# Patient Record
Sex: FEMALE | Race: WHITE | NOT HISPANIC OR LATINO | Employment: OTHER | URBAN - NONMETROPOLITAN AREA
[De-identification: names, ages, dates, MRNs, and addresses within clinical notes are randomized per-mention and may not be internally consistent; named-entity substitution may affect disease eponyms.]

---

## 2018-04-18 ENCOUNTER — HOSPITAL ENCOUNTER (INPATIENT)
Facility: HOSPITAL | Age: 78
LOS: 1 days | Discharge: HOME | End: 2018-04-19
Attending: INTERNAL MEDICINE | Admitting: INTERNAL MEDICINE

## 2018-04-18 PROCEDURE — 93458 L HRT ARTERY/VENTRICLE ANGIO: CPT | Performed by: INTERNAL MEDICINE

## 2018-04-18 PROCEDURE — C1894 INTRO/SHEATH, NON-LASER: HCPCS | Performed by: INTERNAL MEDICINE

## 2018-04-18 PROCEDURE — 25010000002 HEPARIN (PORCINE) PER 1000 UNITS: Performed by: INTERNAL MEDICINE

## 2018-04-18 PROCEDURE — 99283 EMERGENCY DEPT VISIT LOW MDM: CPT

## 2018-04-18 PROCEDURE — 4A023N7 MEASUREMENT OF CARDIAC SAMPLING AND PRESSURE, LEFT HEART, PERCUTANEOUS APPROACH: ICD-10-PCS | Performed by: INTERNAL MEDICINE

## 2018-04-18 PROCEDURE — B2151ZZ FLUOROSCOPY OF LEFT HEART USING LOW OSMOLAR CONTRAST: ICD-10-PCS | Performed by: INTERNAL MEDICINE

## 2018-04-18 PROCEDURE — B2111ZZ FLUOROSCOPY OF MULTIPLE CORONARY ARTERIES USING LOW OSMOLAR CONTRAST: ICD-10-PCS | Performed by: INTERNAL MEDICINE

## 2018-04-18 PROCEDURE — 25010000002 IOPAMIDOL 61 % SOLUTION: Performed by: INTERNAL MEDICINE

## 2018-04-18 PROCEDURE — 99152 MOD SED SAME PHYS/QHP 5/>YRS: CPT | Performed by: INTERNAL MEDICINE

## 2018-04-18 PROCEDURE — 25010000002 DIPHENHYDRAMINE PER 50 MG: Performed by: INTERNAL MEDICINE

## 2018-04-18 RX ORDER — DIPHENHYDRAMINE HYDROCHLORIDE 50 MG/ML
INJECTION INTRAMUSCULAR; INTRAVENOUS AS NEEDED
Status: DISCONTINUED | OUTPATIENT
Start: 2018-04-18 | End: 2018-04-19 | Stop reason: HOSPADM

## 2018-04-18 RX ORDER — LIDOCAINE HYDROCHLORIDE 20 MG/ML
INJECTION, SOLUTION INFILTRATION; PERINEURAL AS NEEDED
Status: DISCONTINUED | OUTPATIENT
Start: 2018-04-18 | End: 2018-04-19 | Stop reason: HOSPADM

## 2018-04-19 ENCOUNTER — APPOINTMENT (OUTPATIENT)
Dept: GENERAL RADIOLOGY | Facility: HOSPITAL | Age: 78
End: 2018-04-19

## 2018-04-19 ENCOUNTER — APPOINTMENT (OUTPATIENT)
Dept: CARDIOLOGY | Facility: HOSPITAL | Age: 78
End: 2018-04-19
Attending: INTERNAL MEDICINE

## 2018-04-19 VITALS
WEIGHT: 133 LBS | BODY MASS INDEX: 23.57 KG/M2 | OXYGEN SATURATION: 94 % | DIASTOLIC BLOOD PRESSURE: 46 MMHG | RESPIRATION RATE: 16 BRPM | HEIGHT: 63 IN | HEART RATE: 60 BPM | TEMPERATURE: 99.1 F | SYSTOLIC BLOOD PRESSURE: 109 MMHG

## 2018-04-19 PROBLEM — I50.23 ACUTE ON CHRONIC SYSTOLIC HEART FAILURE (HCC): Status: ACTIVE | Noted: 2018-04-19

## 2018-04-19 LAB
ANION GAP SERPL CALCULATED.3IONS-SCNC: 12 MMOL/L (ref 4–13)
ARTICHOKE IGE QN: 70 MG/DL (ref 0–99)
BH CV ECHO MEAS - AO MAX PG (FULL): 3.2 MMHG
BH CV ECHO MEAS - AO MAX PG: 7 MMHG
BH CV ECHO MEAS - AO MEAN PG (FULL): 1 MMHG
BH CV ECHO MEAS - AO MEAN PG: 3 MMHG
BH CV ECHO MEAS - AO ROOT AREA (BSA CORRECTED): 2.2
BH CV ECHO MEAS - AO ROOT AREA: 10.2 CM^2
BH CV ECHO MEAS - AO ROOT DIAM: 3.6 CM
BH CV ECHO MEAS - AO V2 MAX: 132 CM/SEC
BH CV ECHO MEAS - AO V2 MEAN: 77.8 CM/SEC
BH CV ECHO MEAS - AO V2 VTI: 23.1 CM
BH CV ECHO MEAS - AVA(I,A): 2.5 CM^2
BH CV ECHO MEAS - AVA(I,D): 2.5 CM^2
BH CV ECHO MEAS - AVA(V,A): 2.3 CM^2
BH CV ECHO MEAS - AVA(V,D): 2.3 CM^2
BH CV ECHO MEAS - BSA(HAYCOCK): 1.6 M^2
BH CV ECHO MEAS - BSA: 1.6 M^2
BH CV ECHO MEAS - BZI_BMI: 23.6 KILOGRAMS/M^2
BH CV ECHO MEAS - BZI_METRIC_HEIGHT: 160 CM
BH CV ECHO MEAS - BZI_METRIC_WEIGHT: 60.3 KG
BH CV ECHO MEAS - CONTRAST EF (2CH): 45.3 ML/M^2
BH CV ECHO MEAS - CONTRAST EF 4CH: 50.7 ML/M^2
BH CV ECHO MEAS - EDV(CUBED): 169.6 ML
BH CV ECHO MEAS - EDV(MOD-SP2): 159 ML
BH CV ECHO MEAS - EDV(MOD-SP4): 122 ML
BH CV ECHO MEAS - EDV(TEICH): 149.6 ML
BH CV ECHO MEAS - EF(CUBED): 36.8 %
BH CV ECHO MEAS - EF(MOD-SP2): 45.3 %
BH CV ECHO MEAS - EF(MOD-SP4): 50.7 %
BH CV ECHO MEAS - EF(TEICH): 29.9 %
BH CV ECHO MEAS - ESV(CUBED): 107.2 ML
BH CV ECHO MEAS - ESV(MOD-SP2): 86.9 ML
BH CV ECHO MEAS - ESV(MOD-SP4): 60.1 ML
BH CV ECHO MEAS - ESV(TEICH): 104.9 ML
BH CV ECHO MEAS - FS: 14.2 %
BH CV ECHO MEAS - IVS/LVPW: 1.9
BH CV ECHO MEAS - IVSD: 1.9 CM
BH CV ECHO MEAS - LA DIMENSION: 5.3 CM
BH CV ECHO MEAS - LA/AO: 1.5
BH CV ECHO MEAS - LAT PEAK E' VEL: 5 CM/SEC
BH CV ECHO MEAS - LV DIASTOLIC VOL/BSA (35-75): 75 ML/M^2
BH CV ECHO MEAS - LV MASS(C)D: 370.7 GRAMS
BH CV ECHO MEAS - LV MASS(C)DI: 228 GRAMS/M^2
BH CV ECHO MEAS - LV MAX PG: 3.8 MMHG
BH CV ECHO MEAS - LV MEAN PG: 2 MMHG
BH CV ECHO MEAS - LV SYSTOLIC VOL/BSA (12-30): 37 ML/M^2
BH CV ECHO MEAS - LV V1 MAX: 97.5 CM/SEC
BH CV ECHO MEAS - LV V1 MEAN: 57.2 CM/SEC
BH CV ECHO MEAS - LV V1 VTI: 18.2 CM
BH CV ECHO MEAS - LVIDD: 5.5 CM
BH CV ECHO MEAS - LVIDS: 4.8 CM
BH CV ECHO MEAS - LVLD AP2: 9.3 CM
BH CV ECHO MEAS - LVLD AP4: 8.6 CM
BH CV ECHO MEAS - LVLS AP2: 8.9 CM
BH CV ECHO MEAS - LVLS AP4: 7.5 CM
BH CV ECHO MEAS - LVOT AREA (M): 3.1 CM^2
BH CV ECHO MEAS - LVOT AREA: 3.1 CM^2
BH CV ECHO MEAS - LVOT DIAM: 2 CM
BH CV ECHO MEAS - LVPWD: 1 CM
BH CV ECHO MEAS - MV A MAX VEL: 33.5 CM/SEC
BH CV ECHO MEAS - MV DEC TIME: 0.1 SEC
BH CV ECHO MEAS - MV E MAX VEL: 99.4 CM/SEC
BH CV ECHO MEAS - MV E/A: 3
BH CV ECHO MEAS - SI(AO): 144.6 ML/M^2
BH CV ECHO MEAS - SI(CUBED): 38.4 ML/M^2
BH CV ECHO MEAS - SI(LVOT): 35.2 ML/M^2
BH CV ECHO MEAS - SI(MOD-SP2): 44.3 ML/M^2
BH CV ECHO MEAS - SI(MOD-SP4): 38.1 ML/M^2
BH CV ECHO MEAS - SI(TEICH): 27.5 ML/M^2
BH CV ECHO MEAS - SV(AO): 235.1 ML
BH CV ECHO MEAS - SV(CUBED): 62.4 ML
BH CV ECHO MEAS - SV(LVOT): 57.2 ML
BH CV ECHO MEAS - SV(MOD-SP2): 72.1 ML
BH CV ECHO MEAS - SV(MOD-SP4): 61.9 ML
BH CV ECHO MEAS - SV(TEICH): 44.7 ML
BUN BLD-MCNC: 14 MG/DL (ref 5–21)
BUN/CREAT SERPL: 15.2 (ref 7–25)
CALCIUM SPEC-SCNC: 9.9 MG/DL (ref 8.4–10.4)
CHLORIDE SERPL-SCNC: 101 MMOL/L (ref 98–110)
CHOLEST SERPL-MCNC: 128 MG/DL (ref 130–200)
CO2 SERPL-SCNC: 29 MMOL/L (ref 24–31)
CREAT BLD-MCNC: 0.92 MG/DL (ref 0.5–1.4)
DEPRECATED RDW RBC AUTO: 47.8 FL (ref 40–54)
DIGOXIN SERPL-MCNC: 0.8 NG/ML (ref 0.8–2)
ERYTHROCYTE [DISTWIDTH] IN BLOOD BY AUTOMATED COUNT: 13.8 % (ref 12–15)
GFR SERPL CREATININE-BSD FRML MDRD: 59 ML/MIN/1.73
GLUCOSE BLD-MCNC: 139 MG/DL (ref 70–100)
HCT VFR BLD AUTO: 39.8 % (ref 37–47)
HDLC SERPL-MCNC: 32 MG/DL
HGB BLD-MCNC: 13.2 G/DL (ref 12–16)
INR PPP: 2.22 (ref 0.91–1.09)
LDLC/HDLC SERPL: 2.29 {RATIO}
LEFT ATRIUM VOLUME INDEX: 101 ML/M2
MAGNESIUM SERPL-MCNC: 1.8 MG/DL (ref 1.4–2.2)
MAXIMAL PREDICTED HEART RATE: 140 BPM
MCH RBC QN AUTO: 31.5 PG (ref 28–32)
MCHC RBC AUTO-ENTMCNC: 33.2 G/DL (ref 33–36)
MCV RBC AUTO: 95 FL (ref 82–98)
PLATELET # BLD AUTO: 156 10*3/MM3 (ref 130–400)
PMV BLD AUTO: 12 FL (ref 6–12)
POTASSIUM BLD-SCNC: 3.8 MMOL/L (ref 3.5–5.3)
PROTHROMBIN TIME: 25.4 SECONDS (ref 11.9–14.6)
RBC # BLD AUTO: 4.19 10*6/MM3 (ref 4.2–5.4)
SODIUM BLD-SCNC: 142 MMOL/L (ref 135–145)
STRESS TARGET HR: 119 BPM
TRIGL SERPL-MCNC: 114 MG/DL (ref 0–149)
WBC NRBC COR # BLD: 13.36 10*3/MM3 (ref 4.8–10.8)

## 2018-04-19 PROCEDURE — 85027 COMPLETE CBC AUTOMATED: CPT | Performed by: INTERNAL MEDICINE

## 2018-04-19 PROCEDURE — 85610 PROTHROMBIN TIME: CPT | Performed by: INTERNAL MEDICINE

## 2018-04-19 PROCEDURE — 80061 LIPID PANEL: CPT | Performed by: INTERNAL MEDICINE

## 2018-04-19 PROCEDURE — 93306 TTE W/DOPPLER COMPLETE: CPT | Performed by: INTERNAL MEDICINE

## 2018-04-19 PROCEDURE — 93306 TTE W/DOPPLER COMPLETE: CPT

## 2018-04-19 PROCEDURE — 80048 BASIC METABOLIC PNL TOTAL CA: CPT | Performed by: INTERNAL MEDICINE

## 2018-04-19 PROCEDURE — 25010000002 FUROSEMIDE PER 20 MG: Performed by: INTERNAL MEDICINE

## 2018-04-19 PROCEDURE — 71045 X-RAY EXAM CHEST 1 VIEW: CPT

## 2018-04-19 PROCEDURE — 83735 ASSAY OF MAGNESIUM: CPT | Performed by: INTERNAL MEDICINE

## 2018-04-19 PROCEDURE — 99236 HOSP IP/OBS SAME DATE HI 85: CPT | Performed by: INTERNAL MEDICINE

## 2018-04-19 PROCEDURE — 80162 ASSAY OF DIGOXIN TOTAL: CPT | Performed by: INTERNAL MEDICINE

## 2018-04-19 RX ORDER — POTASSIUM CHLORIDE 1500 MG/1
20 TABLET, FILM COATED, EXTENDED RELEASE ORAL DAILY
COMMUNITY

## 2018-04-19 RX ORDER — ENALAPRIL MALEATE 10 MG/1
20 TABLET ORAL EVERY 12 HOURS SCHEDULED
Status: DISCONTINUED | OUTPATIENT
Start: 2018-04-19 | End: 2018-04-19 | Stop reason: HOSPADM

## 2018-04-19 RX ORDER — WARFARIN SODIUM 5 MG/1
5 TABLET ORAL
Status: DISCONTINUED | OUTPATIENT
Start: 2018-04-19 | End: 2018-04-19 | Stop reason: HOSPADM

## 2018-04-19 RX ORDER — DIGOXIN 125 MCG
125 TABLET ORAL
COMMUNITY

## 2018-04-19 RX ORDER — POTASSIUM CHLORIDE 750 MG/1
20 CAPSULE, EXTENDED RELEASE ORAL DAILY
Status: DISCONTINUED | OUTPATIENT
Start: 2018-04-19 | End: 2018-04-19 | Stop reason: HOSPADM

## 2018-04-19 RX ORDER — ACETAMINOPHEN 325 MG/1
650 TABLET ORAL EVERY 4 HOURS PRN
Status: DISCONTINUED | OUTPATIENT
Start: 2018-04-19 | End: 2018-04-19 | Stop reason: HOSPADM

## 2018-04-19 RX ORDER — WARFARIN SODIUM 5 MG/1
5 TABLET ORAL
COMMUNITY
End: 2019-01-01 | Stop reason: HOSPADM

## 2018-04-19 RX ORDER — CARVEDILOL 25 MG/1
25 TABLET ORAL DAILY
Status: DISCONTINUED | OUTPATIENT
Start: 2018-04-19 | End: 2018-04-19 | Stop reason: HOSPADM

## 2018-04-19 RX ORDER — SODIUM CHLORIDE 9 MG/ML
100 INJECTION, SOLUTION INTRAVENOUS CONTINUOUS
Status: DISPENSED | OUTPATIENT
Start: 2018-04-19 | End: 2018-04-19

## 2018-04-19 RX ORDER — FUROSEMIDE 10 MG/ML
40 INJECTION INTRAMUSCULAR; INTRAVENOUS EVERY 12 HOURS
Status: DISCONTINUED | OUTPATIENT
Start: 2018-04-19 | End: 2018-04-19 | Stop reason: HOSPADM

## 2018-04-19 RX ORDER — NITROGLYCERIN 0.4 MG/1
0.4 TABLET SUBLINGUAL
Status: DISCONTINUED | OUTPATIENT
Start: 2018-04-19 | End: 2018-04-19 | Stop reason: HOSPADM

## 2018-04-19 RX ORDER — SODIUM CHLORIDE 0.9 % (FLUSH) 0.9 %
1-10 SYRINGE (ML) INJECTION AS NEEDED
Status: DISCONTINUED | OUTPATIENT
Start: 2018-04-19 | End: 2018-04-19 | Stop reason: HOSPADM

## 2018-04-19 RX ORDER — DIGOXIN 125 MCG
125 TABLET ORAL
Status: DISCONTINUED | OUTPATIENT
Start: 2018-04-19 | End: 2018-04-19 | Stop reason: HOSPADM

## 2018-04-19 RX ORDER — ENALAPRIL MALEATE 20 MG/1
20 TABLET ORAL 2 TIMES DAILY
COMMUNITY

## 2018-04-19 RX ORDER — CARVEDILOL 25 MG/1
25 TABLET ORAL 2 TIMES DAILY WITH MEALS
COMMUNITY

## 2018-04-19 RX ADMIN — ENALAPRIL MALEATE 20 MG: 10 TABLET ORAL at 08:49

## 2018-04-19 RX ADMIN — CARVEDILOL 25 MG: 25 TABLET, FILM COATED ORAL at 08:49

## 2018-04-19 RX ADMIN — FUROSEMIDE 40 MG: 10 INJECTION, SOLUTION INTRAMUSCULAR; INTRAVENOUS at 00:36

## 2018-04-19 RX ADMIN — ACETAMINOPHEN 650 MG: 325 TABLET ORAL at 05:33

## 2018-04-19 RX ADMIN — POTASSIUM CHLORIDE 20 MEQ: 750 CAPSULE, EXTENDED RELEASE ORAL at 08:49

## 2018-04-19 NOTE — H&P
Chief Complaint: Abdominal pain, shortness of breath, abnormal ECG    HPI: This is an 78-year-old female with reported hypertrophic cardiomyopathy, chronic atrial fibrillation, suspected chronic systolic congestive heart failure based on previous cardiac catheterization O by Dr. Monsivais indicating that a biventricular pacemaker generator was changed out for chronic systolic heart failure, presenting on transfer from [facility] with initial concerns for acute inferior ST segment elevation myocardial infarction.  I received a call from the emergency department indicating that this patient had EKG changes compared to her previous EKG and clinical symptomatology consistent with potential myocardial infarction.  The patient then arrived here on transfer directly to the cath lab.  In talking with the patient, the patient notes that she has had abdominal pain for about the past 24 hours.  She says this started about 3 PM yesterday afternoon and is located diffusely across the abdomen, crampy in nature, associated with some nausea but no vomiting, nonradiating, no exacerbating or alleviating factors.  She has tried to take antiacids but has had no significant relief.  The pain has waxed and waned and has been moderate to severe at times.  The patient is never experienced abdominal pain like this before.  The patient also notes progressive shortness of breath and dyspnea on exertion.  Her daughter suggested that she go to the emergency department this evening.  While in the emergency department, apparently the patient did complain of some epigastric discomfort.  The daughter says that the emergency room physician at the outlying facility took this as chest pain and then performed the EKG which was thought to be abnormal and thus the patient was transferred here.  The patient has now undergone cardiac catheterization which revealed no significant epicardial vessel stenosis.  However, on ventriculography, she  is noted to have at least moderate decreased ejection fraction and she does have a elevated left ventricular end-diastolic pressure, suggestive of potential acute on chronic systolic congestive heart failure.  The patient's labs at [facility] are referenced below and are largely unremarkable.  The patient is chronically anticoagulated due to her history of atrial fibrillation.  Her primary care physician is Abdelrahman Regalado and he follows her INR levels.  The patient is unclear about her medications but says that her daughter will have a list when she arrives.    Past Medical History:   Diagnosis Date   • A-fib    • HOCM (hypertrophic obstructive cardiomyopathy)      Past Surgical History:   Procedure Laterality Date   • PACEMAKER IMPLANTATION       Home Medications: List currently pending    Allergies: NKDA    Social History   Substance Use Topics   • Smoking status: Never Smoker   • Smokeless tobacco: Not on file   • Alcohol use No     Family History   Problem Relation Age of Onset   • Coronary artery disease Mother    • Coronary artery disease Maternal Grandmother      Review of Systems   Constitution: Positive for weakness and malaise/fatigue. Negative for chills, fever, night sweats and weight loss.   HENT: Negative for congestion and hearing loss.    Eyes: Negative for blurred vision and pain.   Cardiovascular: Positive for dyspnea on exertion. Negative for chest pain, claudication, irregular heartbeat, leg swelling, orthopnea, palpitations, paroxysmal nocturnal dyspnea and syncope.   Respiratory: Positive for shortness of breath. Negative for cough, hemoptysis and wheezing.    Endocrine: Negative for cold intolerance, heat intolerance, polydipsia and polyuria.   Hematologic/Lymphatic: Negative for adenopathy and bleeding problem. Does not bruise/bleed easily.   Skin: Negative for color change, poor wound healing and rash.   Musculoskeletal: Negative for arthritis, back pain, joint pain, joint swelling, myalgias and  neck pain.   Gastrointestinal: Positive for abdominal pain and nausea. Negative for change in bowel habit, constipation, diarrhea, heartburn, hematochezia, melena and vomiting.   Genitourinary: Negative for bladder incontinence, dysuria, frequency, hematuria and nocturia.   Neurological: Negative for dizziness, focal weakness, headaches, light-headedness and loss of balance.   Psychiatric/Behavioral: Negative for altered mental status, memory loss and substance abuse.   Allergic/Immunologic: Negative for hives and persistent infections.     Physical Exam:    Physical Exam   Constitutional: She is oriented to person, place, and time. Vital signs are normal. She appears well-developed and well-nourished. She is cooperative.  Non-toxic appearance. No distress.   Appears pale   HENT:   Head: Normocephalic and atraumatic.   Right Ear: External ear normal.   Left Ear: External ear normal.   Nose: Nose normal.   Mouth/Throat: Uvula is midline, oropharynx is clear and moist and mucous membranes are normal. Mucous membranes are not pale, not dry and not cyanotic. No oropharyngeal exudate.   Eyes: EOM and lids are normal. Pupils are equal, round, and reactive to light.   Neck: Normal range of motion. Neck supple. No hepatojugular reflux and no JVD present. Carotid bruit is not present. No tracheal deviation and no edema present. No thyroid mass and no thyromegaly present.   Cardiovascular: Normal rate, regular rhythm, S1 normal, S2 normal, normal heart sounds, intact distal pulses and normal pulses.   No extrasystoles are present. PMI is not displaced.  Exam reveals no gallop and no friction rub.    No murmur heard.  Pulses:       Radial pulses are 2+ on the right side, and 2+ on the left side.        Femoral pulses are 2+ on the right side, and 2+ on the left side.       Dorsalis pedis pulses are 2+ on the right side, and 2+ on the left side.        Posterior tibial pulses are 2+ on the right side, and 2+ on the left side.    Pulmonary/Chest: Effort normal and breath sounds normal. No accessory muscle usage. No respiratory distress. She has no wheezes. She has no rales. She exhibits no tenderness.   Abdominal: Soft. Normal appearance and bowel sounds are normal. She exhibits no distension, no abdominal bruit and no pulsatile midline mass. There is no hepatosplenomegaly. There is tenderness (diffuse mild).   Musculoskeletal: Normal range of motion. She exhibits no edema, tenderness or deformity.   Lymphadenopathy:     She has no cervical adenopathy.   Neurological: She is oriented to person, place, and time. She has normal strength. No cranial nerve deficit.   Skin: Skin is warm, dry and intact. No rash noted. She is not diaphoretic. No cyanosis or erythema. Nails show no clubbing.   Psychiatric: She has a normal mood and affect. Her speech is normal and behavior is normal. Thought content normal.   Vitals reviewed.    Diagnostic Data:    Labs at [facility]:    WBC 10.9, Hgb 13.5, Hct 41.5, Plt 148  Na 140, K 4.0, Cl 108, CO2 of 27, BUN 13, Cr 0.9, Glu 143  AST 47, ALT 34, Alk Phos 105, TB 1.6, Pro 6.8, Alb 4.3  Lipase 127  Mg 2.1  Ca 9.4  NT BNP 7046 (ULN is 450)  Tn 0.06    ECG: paced with inferior ST elevation (different from previous ECG here)    ASSESSMENT/PLAN:    1. Abdominal pain  2. Shortness of breath  3. Initial concern for possible STEMI - normal coronaries, therefore not a STEMI  4. Reported chronic systolic dysfunction (previous cath note by Dr. Monsivais indicated BiV pacer gen change from chronic systolic CHF) with concern for acute on chronic systolic CHF as cause for #1, 2  5. Hypertrophic obstructive cardiomyopathy  6. Reported chronic atrial fibrillation    - No beds on 4B, so will have to admit to CCU  - Administer IV lasix and monitor response  - Strict ins and outs, daily weights, fluid restriction, low Na diet  - Home medications resumed when list available  - Check echocardiogram in AM  - No lab abnormality to explain  abdominal pain, will monitor as diuresis is instituted, suspect some nausea and abdominal pain potentially from CHF  - Continue Coumadin and check INR levels  - Re-check AM labs and CXR  - Full Code

## 2018-04-19 NOTE — DISCHARGE SUMMARY
[facility] HEART GROUP DISCHARGE    Date of Discharge:  4/19/2018    Discharge Diagnosis:   Acute on chronic systolic congestive heart failure, mild exacerbation  Abdominal pain and nausea, mild, chronic, potentially worsened by CHF  Hypertrophic obstructive cardiomyopathy   BIV pacer in place  Chronic atrial fibrillation - anticoagulated with warfarin with therapeutic INR    Presenting Problem/History of Present Illness  Acute on chronic systolic heart failure [I50.23]    Per HPI on admission by Dr. Ramirez: This is an 78-year-old female with reported hypertrophic cardiomyopathy, chronic atrial fibrillation, suspected chronic systolic congestive heart failure based on previous cardiac catheterization O by Dr. Monsivais indicating that a biventricular pacemaker generator was changed out for chronic systolic heart failure, presenting on transfer from [facility] with initial concerns for acute inferior ST segment elevation myocardial infarction.  I received a call from the emergency department indicating that this patient had EKG changes compared to her previous EKG and clinical symptomatology consistent with potential myocardial infarction.  The patient then arrived here on transfer directly to the cath lab.  In talking with the patient, the patient notes that she has had abdominal pain for about the past 24 hours.  She says this started about 3 PM yesterday afternoon and is located diffusely across the abdomen, crampy in nature, associated with some nausea but no vomiting, nonradiating, no exacerbating or alleviating factors.  She has tried to take antiacids but has had no significant relief.  The pain has waxed and waned and has been moderate to severe at times.  The patient is never experienced abdominal pain like this before.  The patient also notes progressive shortness of breath and dyspnea on exertion.  Her daughter suggested that she go to the emergency department this evening.   While in the emergency department, apparently the patient did complain of some epigastric discomfort.  The daughter says that the emergency room physician at the outlying facility took this as chest pain and then performed the EKG which was thought to be abnormal and thus the patient was transferred here.  The patient has now undergone cardiac catheterization which revealed no significant epicardial vessel stenosis.  However, on ventriculography, she is noted to have at least moderate decreased ejection fraction and she does have a elevated left ventricular end-diastolic pressure, suggestive of potential acute on chronic systolic congestive heart failure.  The patient's labs at [facility] are referenced below and are largely unremarkable.  The patient is chronically anticoagulated due to her history of atrial fibrillation.  Her primary care physician is Abdelrahman Regalado and he follows her INR levels.  The patient is unclear about her medications but says that her daughter will have a list when she arrives.    Hospital Course  Patient is a 78 y.o. female presented with the symptoms discussed above. Cath revealed near normal coronaries, elevated LVEDP, and at least moderately reduced LVEF. She was treated with IV lasix for her mild exacerbation of acute CHF and her shortness of breath has resolved. On exam today she is euvolemic and denies abdominal pain, but admits mild nausea. Her medications will be continued as listed below. Echo was completed and is pending. Due to mild leukocytosis, CXR will be completed prior to discharge. She has been seen and examined by Dr. Ramirez as well and is felt to be stable for discharge with close follow up with her PCP and cardiologist Dr. Tobin.     Procedures Performed  Procedure(s):  Left Heart Cath       Lab Results (last 72 hours)     Procedure Component Value Units Date/Time    Lipid Panel [[id number]]  (Abnormal) Collected:  04/19/18 0207    Specimen:  Blood Updated:  04/19/18 0348      Total Cholesterol 128 (L) mg/dL      Triglycerides 114 mg/dL      HDL Cholesterol 32 (L) mg/dL      LDL Cholesterol  70 mg/dL      LDL/HDL Ratio 2.29    Digoxin Level [[id number]]  (Normal) Collected:  04/19/18 0209    Specimen:  Blood Updated:  04/19/18 0335     Digoxin 0.80 ng/mL     Basic Metabolic Panel [[id number]]  (Abnormal) Collected:  04/19/18 0207    Specimen:  Blood Updated:  04/19/18 0333     Glucose 139 (H) mg/dL      BUN 14 mg/dL      Creatinine 0.92 mg/dL      Sodium 142 mmol/L      Potassium 3.8 mmol/L      Chloride 101 mmol/L      CO2 29.0 mmol/L      Calcium 9.9 mg/dL      eGFR Non African Amer 59 (L) mL/min/1.73      BUN/Creatinine Ratio 15.2     Anion Gap 12.0 mmol/L     Narrative:       The MDRD GFR formula is only valid for adults with stable renal function between ages 18 and 70.    Magnesium [[id number]]  (Normal) Collected:  04/19/18 0207    Specimen:  Blood Updated:  04/19/18 0333     Magnesium 1.8 mg/dL     Protime-INR [[id number]]  (Abnormal) Collected:  04/19/18 0207    Specimen:  Blood Updated:  04/19/18 0331     Protime 25.4 (H) Seconds      INR 2.22 (H)    CBC (No Diff) [[id number]]  (Abnormal) Collected:  04/19/18 0207    Specimen:  Blood Updated:  04/19/18 0320     WBC 13.36 (H) 10*3/mm3      RBC 4.19 (L) 10*6/mm3      Hemoglobin 13.2 g/dL      Hematocrit 39.8 %      MCV 95.0 fL      MCH 31.5 pg      MCHC 33.2 g/dL      RDW 13.8 %      RDW-SD 47.8 fl      MPV 12.0 fL      Platelets 156 10*3/mm3         Condition on Discharge: Stable     Physical Exam at Discharge  General: alert and oriented   Card: RRR  Resp: CTA  Extrem: no edema    -right radial cath site CDI, no hematoma, good radial pulse and cap refill right hand     Vital Signs  Temp:  [98.6 °F (37 °C)-99.4 °F (37.4 °C)] 99.1 °F (37.3 °C)  Heart Rate:  [60-79] 60  Resp:  [16-28] 16  BP: (109-155)/(46-78) 109/46    Discharge Disposition  Home or Self Care    Discharge Medications   Lisa Latham   Home Medication  Instructions JOHN:[id number]    Printed on:04/19/18 1041   Medication Information                      carvedilol (COREG) 25 MG tablet  Take 25 mg by mouth Daily.             digoxin (LANOXIN) 125 MCG tablet  Take 125 mcg by mouth Daily.             enalapril (VASOTEC) 20 MG tablet  Take 20 mg by mouth 2 (Two) Times a Day.             Ergocalciferol (VITAMIN D2 PO)  Take 50,000 Units by mouth 1 (One) Time Per Week.             potassium chloride (K-DUR) 10 MEQ CR tablet  Take 20 mEq by mouth Daily.             warfarin (COUMADIN) 5 MG tablet  Take 5 mg by mouth Daily.                 Discharge Diet: cardiac, low sodium     Activity at Discharge: No strenuous activity or lifting greater than 3-5 lbs with right arm x 2 weeks.     Follow-up Appointments  PCP Dr. Abdelrahman Regalado 1 week with continued INR monitoring   Dr. Tobin- cardiology in [place] 4 weeks     The patient has been instructed on the importance of compliance with medications, signs and symptoms to report, and temporary activity restrictions following cardiac catheterization . The patient voices understanding of these instructions.       Marissa Corbett, SHARYN  04/19/18  10:49 AM

## 2018-04-19 NOTE — PLAN OF CARE
Problem: Patient Care Overview  Goal: Plan of Care Review  Outcome: Ongoing (interventions implemented as appropriate)   04/19/18 0554   Coping/Psychosocial   Plan of Care Reviewed With patient   Plan of Care Review   Progress improving   OTHER   Outcome Summary Pt transfered to unit from Cath lab. Had non interventional cath with TR band to right radial in place. No bleeding noted. Lasix given per order with positive results. C/O headache around 0500, PRN medication given per order with positive results. Afebrile. VSS.     Goal: Individualization and Mutuality  Outcome: Ongoing (interventions implemented as appropriate)    Goal: Discharge Needs Assessment  Outcome: Ongoing (interventions implemented as appropriate)    Goal: Interprofessional Rounds/Family Conf  Outcome: Ongoing (interventions implemented as appropriate)      Problem: Fall Risk (Adult)  Goal: Identify Related Risk Factors and Signs and Symptoms  Outcome: Ongoing (interventions implemented as appropriate)    Goal: Absence of Fall  Outcome: Ongoing (interventions implemented as appropriate)      Problem: Skin Injury Risk (Adult)  Goal: Identify Related Risk Factors and Signs and Symptoms  Outcome: Ongoing (interventions implemented as appropriate)    Goal: Skin Health and Integrity  Outcome: Ongoing (interventions implemented as appropriate)      Problem: Cardiac: ACS (Acute Coronary Syndrome) (Adult)  Goal: Signs and Symptoms of Listed Potential Problems Will be Absent, Minimized or Managed (Cardiac: ACS)  Outcome: Ongoing (interventions implemented as appropriate)

## 2019-01-01 ENCOUNTER — PREP FOR SURGERY (OUTPATIENT)
Dept: OTHER | Facility: HOSPITAL | Age: 79
End: 2019-01-01

## 2019-01-01 ENCOUNTER — APPOINTMENT (OUTPATIENT)
Dept: CT IMAGING | Facility: HOSPITAL | Age: 79
End: 2019-01-01

## 2019-01-01 ENCOUNTER — CLINICAL SUPPORT NO REQUIREMENTS (OUTPATIENT)
Dept: CARDIOLOGY | Facility: CLINIC | Age: 79
End: 2019-01-01

## 2019-01-01 ENCOUNTER — LAB REQUISITION (OUTPATIENT)
Dept: LAB | Facility: HOSPITAL | Age: 79
End: 2019-01-01

## 2019-01-01 ENCOUNTER — TELEPHONE (OUTPATIENT)
Dept: CARDIOLOGY | Facility: CLINIC | Age: 79
End: 2019-01-01

## 2019-01-01 ENCOUNTER — HOSPITAL ENCOUNTER (EMERGENCY)
Facility: HOSPITAL | Age: 79
Discharge: ANOTHER HOSPITAL | End: 2019-10-31
Attending: EMERGENCY MEDICINE | Admitting: EMERGENCY MEDICINE

## 2019-01-01 ENCOUNTER — HOSPITAL ENCOUNTER (OUTPATIENT)
Facility: HOSPITAL | Age: 79
Setting detail: OBSERVATION
Discharge: HOME | End: 2019-03-12
Attending: INTERNAL MEDICINE | Admitting: INTERNAL MEDICINE

## 2019-01-01 ENCOUNTER — READMISSION MANAGEMENT (OUTPATIENT)
Dept: CALL CENTER | Facility: HOSPITAL | Age: 79
End: 2019-01-01

## 2019-01-01 ENCOUNTER — DOCUMENTATION (OUTPATIENT)
Dept: CARDIOLOGY | Facility: CLINIC | Age: 79
End: 2019-01-01

## 2019-01-01 ENCOUNTER — APPOINTMENT (OUTPATIENT)
Dept: GENERAL RADIOLOGY | Facility: HOSPITAL | Age: 79
End: 2019-01-01

## 2019-01-01 ENCOUNTER — APPOINTMENT (OUTPATIENT)
Dept: CARDIOLOGY | Facility: HOSPITAL | Age: 79
End: 2019-01-01

## 2019-01-01 ENCOUNTER — NURSE TRIAGE (OUTPATIENT)
Dept: CALL CENTER | Facility: HOSPITAL | Age: 79
End: 2019-01-01

## 2019-01-01 ENCOUNTER — HOSPITAL ENCOUNTER (OUTPATIENT)
Facility: HOSPITAL | Age: 79
Discharge: HOME | End: 2019-07-25
Attending: INTERNAL MEDICINE | Admitting: INTERNAL MEDICINE

## 2019-01-01 VITALS
HEART RATE: 63 BPM | OXYGEN SATURATION: 92 % | TEMPERATURE: 98.3 F | HEIGHT: 65 IN | SYSTOLIC BLOOD PRESSURE: 129 MMHG | RESPIRATION RATE: 20 BRPM | DIASTOLIC BLOOD PRESSURE: 60 MMHG | WEIGHT: 134.5 LBS | BODY MASS INDEX: 22.41 KG/M2

## 2019-01-01 VITALS
DIASTOLIC BLOOD PRESSURE: 57 MMHG | BODY MASS INDEX: 21.63 KG/M2 | OXYGEN SATURATION: 92 % | WEIGHT: 129.8 LBS | HEART RATE: 64 BPM | HEIGHT: 65 IN | RESPIRATION RATE: 21 BRPM | SYSTOLIC BLOOD PRESSURE: 123 MMHG | TEMPERATURE: 98.6 F

## 2019-01-01 VITALS
WEIGHT: 133.7 LBS | BODY MASS INDEX: 22.25 KG/M2 | OXYGEN SATURATION: 96 % | DIASTOLIC BLOOD PRESSURE: 68 MMHG | SYSTOLIC BLOOD PRESSURE: 134 MMHG | RESPIRATION RATE: 18 BRPM | HEART RATE: 68 BPM | TEMPERATURE: 96.6 F

## 2019-01-01 DIAGNOSIS — I42.8 NON-ISCHEMIC CARDIOMYOPATHY (HCC): Chronic | ICD-10-CM

## 2019-01-01 DIAGNOSIS — Z95.0 PRESENCE OF BIVENTRICULAR CARDIAC PACEMAKER: Primary | ICD-10-CM

## 2019-01-01 DIAGNOSIS — I50.22 CHRONIC SYSTOLIC CHF (CONGESTIVE HEART FAILURE) (HCC): Chronic | ICD-10-CM

## 2019-01-01 DIAGNOSIS — Z95.0 PRESENCE OF BIVENTRICULAR CARDIAC PACEMAKER: ICD-10-CM

## 2019-01-01 DIAGNOSIS — I50.22 CHRONIC SYSTOLIC CHF (CONGESTIVE HEART FAILURE) (HCC): Primary | Chronic | ICD-10-CM

## 2019-01-01 DIAGNOSIS — I63.512 CEREBROVASCULAR ACCIDENT (CVA) DUE TO OCCLUSION OF LEFT MIDDLE CEREBRAL ARTERY (HCC): Primary | ICD-10-CM

## 2019-01-01 DIAGNOSIS — Z00.00 ENCOUNTER FOR GENERAL ADULT MEDICAL EXAMINATION WITHOUT ABNORMAL FINDINGS: ICD-10-CM

## 2019-01-01 DIAGNOSIS — Z95.0 PACEMAKER: Primary | ICD-10-CM

## 2019-01-01 LAB
ALBUMIN SERPL-MCNC: 3.5 G/DL (ref 3.5–5.2)
ALBUMIN SERPL-MCNC: 4.6 G/DL (ref 3.5–5.2)
ALBUMIN SERPL-MCNC: 4.8 G/DL (ref 3.5–5)
ALBUMIN/GLOB SERPL: 1.1 G/DL
ALBUMIN/GLOB SERPL: 1.9 G/DL
ALBUMIN/GLOB SERPL: 2 G/DL (ref 1.1–2.5)
ALP SERPL-CCNC: 126 U/L (ref 39–117)
ALP SERPL-CCNC: 255 U/L (ref 39–117)
ALP SERPL-CCNC: 94 U/L (ref 24–120)
ALT SERPL W P-5'-P-CCNC: 11 U/L (ref 1–33)
ALT SERPL W P-5'-P-CCNC: 26 U/L (ref 0–54)
ALT SERPL W P-5'-P-CCNC: 67 U/L (ref 1–33)
ANION GAP SERPL CALCULATED.3IONS-SCNC: 10 MMOL/L (ref 4–13)
ANION GAP SERPL CALCULATED.3IONS-SCNC: 10 MMOL/L (ref 5–15)
ANION GAP SERPL CALCULATED.3IONS-SCNC: 9 MMOL/L (ref 4–13)
AST SERPL-CCNC: 100 U/L (ref 1–32)
AST SERPL-CCNC: 18 U/L (ref 1–32)
AST SERPL-CCNC: 29 U/L (ref 7–45)
BACTERIA UR QL AUTO: ABNORMAL /HPF
BASOPHILS # BLD AUTO: 0.02 10*3/MM3 (ref 0–0.2)
BASOPHILS # BLD AUTO: 0.03 10*3/MM3 (ref 0–0.2)
BASOPHILS NFR BLD AUTO: 0.3 % (ref 0–1.5)
BASOPHILS NFR BLD AUTO: 0.4 % (ref 0–1.5)
BH CV ECHO MEAS - AO MAX PG (FULL): 3.4 MMHG
BH CV ECHO MEAS - AO MAX PG: 7.4 MMHG
BH CV ECHO MEAS - AO MEAN PG (FULL): 2 MMHG
BH CV ECHO MEAS - AO MEAN PG: 4 MMHG
BH CV ECHO MEAS - AO ROOT AREA (BSA CORRECTED): 1.6
BH CV ECHO MEAS - AO ROOT AREA: 5.3 CM^2
BH CV ECHO MEAS - AO ROOT DIAM: 2.6 CM
BH CV ECHO MEAS - AO V2 MAX: 136 CM/SEC
BH CV ECHO MEAS - AO V2 MEAN: 93.6 CM/SEC
BH CV ECHO MEAS - AO V2 VTI: 28.9 CM
BH CV ECHO MEAS - AVA(I,A): 1.6 CM^2
BH CV ECHO MEAS - AVA(I,D): 1.6 CM^2
BH CV ECHO MEAS - AVA(V,A): 1.9 CM^2
BH CV ECHO MEAS - AVA(V,D): 1.9 CM^2
BH CV ECHO MEAS - BSA(HAYCOCK): 1.7 M^2
BH CV ECHO MEAS - BSA: 1.6 M^2
BH CV ECHO MEAS - BZI_BMI: 23.7 KILOGRAMS/M^2
BH CV ECHO MEAS - BZI_METRIC_HEIGHT: 160 CM
BH CV ECHO MEAS - BZI_METRIC_WEIGHT: 60.8 KG
BH CV ECHO MEAS - EDV(CUBED): 100.5 ML
BH CV ECHO MEAS - EDV(MOD-SP4): 125 ML
BH CV ECHO MEAS - EDV(TEICH): 99.8 ML
BH CV ECHO MEAS - EF(CUBED): 35.9 %
BH CV ECHO MEAS - EF(MOD-SP4): 35.1 %
BH CV ECHO MEAS - EF(TEICH): 29.5 %
BH CV ECHO MEAS - ESV(CUBED): 64.5 ML
BH CV ECHO MEAS - ESV(MOD-SP4): 81.1 ML
BH CV ECHO MEAS - ESV(TEICH): 70.4 ML
BH CV ECHO MEAS - FS: 13.8 %
BH CV ECHO MEAS - IVS/LVPW: 1.2
BH CV ECHO MEAS - IVSD: 1.2 CM
BH CV ECHO MEAS - LA DIMENSION: 4.9 CM
BH CV ECHO MEAS - LA/AO: 1.9
BH CV ECHO MEAS - LAT PEAK E' VEL: 3.5 CM/SEC
BH CV ECHO MEAS - LV DIASTOLIC VOL/BSA (35-75): 76.6 ML/M^2
BH CV ECHO MEAS - LV MASS(C)D: 178.2 GRAMS
BH CV ECHO MEAS - LV MASS(C)DI: 109.3 GRAMS/M^2
BH CV ECHO MEAS - LV MAX PG: 4 MMHG
BH CV ECHO MEAS - LV MEAN PG: 2 MMHG
BH CV ECHO MEAS - LV SYSTOLIC VOL/BSA (12-30): 49.7 ML/M^2
BH CV ECHO MEAS - LV V1 MAX: 99.9 CM/SEC
BH CV ECHO MEAS - LV V1 MEAN: 64.7 CM/SEC
BH CV ECHO MEAS - LV V1 VTI: 18.2 CM
BH CV ECHO MEAS - LVIDD: 4.7 CM
BH CV ECHO MEAS - LVIDS: 4 CM
BH CV ECHO MEAS - LVLD AP4: 8.3 CM
BH CV ECHO MEAS - LVLS AP4: 8.1 CM
BH CV ECHO MEAS - LVOT AREA (M): 2.5 CM^2
BH CV ECHO MEAS - LVOT AREA: 2.5 CM^2
BH CV ECHO MEAS - LVOT DIAM: 1.8 CM
BH CV ECHO MEAS - LVPWD: 0.98 CM
BH CV ECHO MEAS - MED PEAK E' VEL: 3.26 CM/SEC
BH CV ECHO MEAS - MV DEC TIME: 0.13 SEC
BH CV ECHO MEAS - MV E MAX VEL: 83.6 CM/SEC
BH CV ECHO MEAS - RAP SYSTOLE: 5 MMHG
BH CV ECHO MEAS - RVSP: 58 MMHG
BH CV ECHO MEAS - SI(AO): 94.1 ML/M^2
BH CV ECHO MEAS - SI(CUBED): 22.1 ML/M^2
BH CV ECHO MEAS - SI(LVOT): 28.4 ML/M^2
BH CV ECHO MEAS - SI(MOD-SP4): 26.9 ML/M^2
BH CV ECHO MEAS - SI(TEICH): 18 ML/M^2
BH CV ECHO MEAS - SV(AO): 153.4 ML
BH CV ECHO MEAS - SV(CUBED): 36.1 ML
BH CV ECHO MEAS - SV(LVOT): 46.3 ML
BH CV ECHO MEAS - SV(MOD-SP4): 43.9 ML
BH CV ECHO MEAS - SV(TEICH): 29.4 ML
BH CV ECHO MEAS - TR MAX VEL: 364 CM/SEC
BH CV ECHO MEASUREMENTS AVERAGE E/E' RATIO: 24.73
BILIRUB SERPL-MCNC: 0.8 MG/DL (ref 0.2–1.2)
BILIRUB SERPL-MCNC: 1.1 MG/DL (ref 0.2–1.2)
BILIRUB SERPL-MCNC: 1.3 MG/DL (ref 0.1–1)
BILIRUB UR QL STRIP: NEGATIVE
BUN BLD-MCNC: 15 MG/DL (ref 8–23)
BUN BLD-MCNC: 16 MG/DL (ref 5–21)
BUN BLD-MCNC: 16 MG/DL (ref 5–21)
BUN BLD-MCNC: 36 MG/DL (ref 8–23)
BUN BLD-MCNC: 40 MG/DL (ref 8–23)
BUN/CREAT SERPL: 21.4 (ref 7–25)
BUN/CREAT SERPL: 23.2 (ref 7–25)
BUN/CREAT SERPL: 26.2 (ref 7–25)
BUN/CREAT SERPL: 56.3 (ref 7–25)
BUN/CREAT SERPL: 57.1 (ref 7–25)
CALCIUM SPEC-SCNC: 9.4 MG/DL (ref 8.6–10.5)
CALCIUM SPEC-SCNC: 9.6 MG/DL (ref 8.4–10.4)
CALCIUM SPEC-SCNC: 9.8 MG/DL (ref 8.6–10.5)
CALCIUM SPEC-SCNC: 9.9 MG/DL (ref 8.4–10.4)
CALCIUM SPEC-SCNC: 9.9 MG/DL (ref 8.6–10.5)
CHLORIDE SERPL-SCNC: 102 MMOL/L (ref 98–110)
CHLORIDE SERPL-SCNC: 102 MMOL/L (ref 98–110)
CHLORIDE SERPL-SCNC: 104 MMOL/L (ref 98–107)
CHLORIDE SERPL-SCNC: 116 MMOL/L (ref 98–107)
CHLORIDE SERPL-SCNC: 117 MMOL/L (ref 98–107)
CLARITY UR: ABNORMAL
CO2 SERPL-SCNC: 28 MMOL/L (ref 22–29)
CO2 SERPL-SCNC: 28 MMOL/L (ref 24–31)
CO2 SERPL-SCNC: 29 MMOL/L (ref 24–31)
CO2 SERPL-SCNC: 30 MMOL/L (ref 22–29)
CO2 SERPL-SCNC: 33 MMOL/L (ref 22–29)
COD CRY URNS QL: ABNORMAL /HPF
COLOR UR: ABNORMAL
CREAT BLD-MCNC: 0.61 MG/DL (ref 0.5–1.4)
CREAT BLD-MCNC: 0.64 MG/DL (ref 0.57–1)
CREAT BLD-MCNC: 0.69 MG/DL (ref 0.5–1.4)
CREAT BLD-MCNC: 0.7 MG/DL (ref 0.57–1)
CREAT BLD-MCNC: 0.7 MG/DL (ref 0.57–1)
DEPRECATED RDW RBC AUTO: 46.3 FL (ref 40–54)
DEPRECATED RDW RBC AUTO: 47.8 FL (ref 40–54)
DEPRECATED RDW RBC AUTO: 48.4 FL (ref 40–54)
DEPRECATED RDW RBC AUTO: 50.4 FL (ref 37–54)
DEPRECATED RDW RBC AUTO: 56.4 FL (ref 37–54)
DIGOXIN SERPL-MCNC: 0.9 NG/ML (ref 0.6–1.2)
EOSINOPHIL # BLD AUTO: 0.09 10*3/MM3 (ref 0–0.4)
EOSINOPHIL # BLD AUTO: 0.09 10*3/MM3 (ref 0–0.4)
EOSINOPHIL NFR BLD AUTO: 1.2 % (ref 0.3–6.2)
EOSINOPHIL NFR BLD AUTO: 1.4 % (ref 0.3–6.2)
ERYTHROCYTE [DISTWIDTH] IN BLOOD BY AUTOMATED COUNT: 13.4 % (ref 12–15)
ERYTHROCYTE [DISTWIDTH] IN BLOOD BY AUTOMATED COUNT: 13.4 % (ref 12–15)
ERYTHROCYTE [DISTWIDTH] IN BLOOD BY AUTOMATED COUNT: 13.5 % (ref 12–15)
ERYTHROCYTE [DISTWIDTH] IN BLOOD BY AUTOMATED COUNT: 14.3 % (ref 12.3–15.4)
ERYTHROCYTE [DISTWIDTH] IN BLOOD BY AUTOMATED COUNT: 15.1 % (ref 12.3–15.4)
GFR SERPL CREATININE-BSD FRML MDRD: 80 ML/MIN/1.73
GFR SERPL CREATININE-BSD FRML MDRD: 80 ML/MIN/1.73
GFR SERPL CREATININE-BSD FRML MDRD: 82 ML/MIN/1.73
GFR SERPL CREATININE-BSD FRML MDRD: 89 ML/MIN/1.73
GFR SERPL CREATININE-BSD FRML MDRD: 94 ML/MIN/1.73
GLOBULIN UR ELPH-MCNC: 2.4 GM/DL
GLOBULIN UR ELPH-MCNC: 2.4 GM/DL
GLOBULIN UR ELPH-MCNC: 3.1 GM/DL
GLUCOSE BLD-MCNC: 103 MG/DL (ref 70–100)
GLUCOSE BLD-MCNC: 132 MG/DL (ref 70–100)
GLUCOSE BLD-MCNC: 150 MG/DL (ref 65–99)
GLUCOSE BLD-MCNC: 166 MG/DL (ref 65–99)
GLUCOSE BLD-MCNC: 172 MG/DL (ref 65–99)
GLUCOSE BLDC GLUCOMTR-MCNC: 142 MG/DL (ref 70–130)
GLUCOSE UR STRIP-MCNC: NEGATIVE MG/DL
HCT VFR BLD AUTO: 35.7 % (ref 37–47)
HCT VFR BLD AUTO: 38.8 % (ref 34–46.6)
HCT VFR BLD AUTO: 40 % (ref 37–47)
HCT VFR BLD AUTO: 40.2 % (ref 37–47)
HCT VFR BLD AUTO: 42 % (ref 34–46.6)
HGB BLD-MCNC: 12.1 G/DL (ref 12–16)
HGB BLD-MCNC: 13 G/DL (ref 12–15.9)
HGB BLD-MCNC: 13 G/DL (ref 12–15.9)
HGB BLD-MCNC: 13.5 G/DL (ref 12–16)
HGB BLD-MCNC: 13.7 G/DL (ref 12–16)
HGB UR QL STRIP.AUTO: ABNORMAL
HOLD SPECIMEN: NORMAL
INR PPP: 1.14 (ref 0.91–1.09)
INR PPP: 1.54 (ref 0.91–1.09)
INR PPP: 1.58 (ref 0.91–1.09)
INR PPP: 1.59 (ref 0.91–1.09)
KETONES UR QL STRIP: NEGATIVE
LEFT ATRIUM VOLUME INDEX: 89 ML/M2
LEFT ATRIUM VOLUME: 145 CM3
LEUKOCYTE ESTERASE UR QL STRIP.AUTO: ABNORMAL
LV EF 2D ECHO EST: 35 %
LYMPHOCYTES # BLD AUTO: 0.85 10*3/MM3 (ref 0.7–3.1)
LYMPHOCYTES # BLD AUTO: 0.99 10*3/MM3 (ref 0.7–3.1)
LYMPHOCYTES NFR BLD AUTO: 13.5 % (ref 19.6–45.3)
LYMPHOCYTES NFR BLD AUTO: 13.7 % (ref 19.6–45.3)
MAGNESIUM SERPL-MCNC: 1.8 MG/DL (ref 1.4–2.2)
MAXIMAL PREDICTED HEART RATE: 139 BPM
MCH RBC QN AUTO: 31.2 PG (ref 26.6–33)
MCH RBC QN AUTO: 31.8 PG (ref 28–32)
MCH RBC QN AUTO: 32.3 PG (ref 26.6–33)
MCH RBC QN AUTO: 33.1 PG (ref 28–32)
MCH RBC QN AUTO: 33.5 PG (ref 28–32)
MCHC RBC AUTO-ENTMCNC: 31 G/DL (ref 31.5–35.7)
MCHC RBC AUTO-ENTMCNC: 33.5 G/DL (ref 31.5–35.7)
MCHC RBC AUTO-ENTMCNC: 33.6 G/DL (ref 33–36)
MCHC RBC AUTO-ENTMCNC: 33.9 G/DL (ref 33–36)
MCHC RBC AUTO-ENTMCNC: 34.3 G/DL (ref 33–36)
MCV RBC AUTO: 100.7 FL (ref 79–97)
MCV RBC AUTO: 94.8 FL (ref 82–98)
MCV RBC AUTO: 96.3 FL (ref 79–97)
MCV RBC AUTO: 97.5 FL (ref 82–98)
MCV RBC AUTO: 97.8 FL (ref 82–98)
MONOCYTES # BLD AUTO: 0.53 10*3/MM3 (ref 0.1–0.9)
MONOCYTES # BLD AUTO: 1.09 10*3/MM3 (ref 0.1–0.9)
MONOCYTES NFR BLD AUTO: 14.9 % (ref 5–12)
MONOCYTES NFR BLD AUTO: 8.5 % (ref 5–12)
NEUTROPHILS # BLD AUTO: 4.72 10*3/MM3 (ref 1.7–7)
NEUTROPHILS # BLD AUTO: 5.05 10*3/MM3 (ref 1.7–7)
NEUTROPHILS NFR BLD AUTO: 69 % (ref 42.7–76)
NEUTROPHILS NFR BLD AUTO: 75.9 % (ref 42.7–76)
NITRITE UR QL STRIP: NEGATIVE
NT-PROBNP SERPL-MCNC: 7040 PG/ML (ref 0–1800)
NT-PROBNP SERPL-MCNC: 8387 PG/ML (ref 5–1800)
NT-PROBNP SERPL-MCNC: ABNORMAL PG/ML (ref 5–1800)
PH UR STRIP.AUTO: 8 [PH] (ref 5–8)
PHOSPHATE SERPL-MCNC: 3.5 MG/DL (ref 2.5–4.5)
PLATELET # BLD AUTO: 140 10*3/MM3 (ref 140–450)
PLATELET # BLD AUTO: 154 10*3/MM3 (ref 130–400)
PLATELET # BLD AUTO: 160 10*3/MM3 (ref 130–400)
PLATELET # BLD AUTO: 194 10*3/MM3 (ref 130–400)
PLATELET # BLD AUTO: 199 10*3/MM3 (ref 140–450)
PMV BLD AUTO: 11.1 FL (ref 6–12)
PMV BLD AUTO: 11.6 FL (ref 6–12)
PMV BLD AUTO: 11.6 FL (ref 6–12)
PMV BLD AUTO: 11.7 FL (ref 6–12)
PMV BLD AUTO: 13.1 FL (ref 6–12)
POTASSIUM BLD-SCNC: 3.9 MMOL/L (ref 3.5–5.3)
POTASSIUM BLD-SCNC: 4 MMOL/L (ref 3.5–5.2)
POTASSIUM BLD-SCNC: 4.2 MMOL/L (ref 3.5–5.2)
POTASSIUM BLD-SCNC: 4.2 MMOL/L (ref 3.5–5.2)
POTASSIUM BLD-SCNC: 4.2 MMOL/L (ref 3.5–5.3)
PROT SERPL-MCNC: 6.6 G/DL (ref 6–8.5)
PROT SERPL-MCNC: 7 G/DL (ref 6–8.5)
PROT SERPL-MCNC: 7.2 G/DL (ref 6.3–8.7)
PROT UR QL STRIP: ABNORMAL
PROTHROMBIN TIME: 15 SECONDS (ref 11.9–14.6)
PROTHROMBIN TIME: 19 SECONDS (ref 11.9–14.6)
PROTHROMBIN TIME: 19.4 SECONDS (ref 11.9–14.6)
PROTHROMBIN TIME: 19.5 SECONDS (ref 11.9–14.6)
RBC # BLD AUTO: 3.66 10*6/MM3 (ref 4.2–5.4)
RBC # BLD AUTO: 4.03 10*6/MM3 (ref 3.77–5.28)
RBC # BLD AUTO: 4.09 10*6/MM3 (ref 4.2–5.4)
RBC # BLD AUTO: 4.17 10*6/MM3 (ref 3.77–5.28)
RBC # BLD AUTO: 4.24 10*6/MM3 (ref 4.2–5.4)
RBC # UR: ABNORMAL /HPF
REF LAB TEST METHOD: ABNORMAL
SODIUM BLD-SCNC: 140 MMOL/L (ref 135–145)
SODIUM BLD-SCNC: 140 MMOL/L (ref 135–145)
SODIUM BLD-SCNC: 142 MMOL/L (ref 136–145)
SODIUM BLD-SCNC: 156 MMOL/L (ref 136–145)
SODIUM BLD-SCNC: 160 MMOL/L (ref 136–145)
SP GR UR STRIP: 1.02 (ref 1–1.03)
SQUAMOUS #/AREA URNS HPF: ABNORMAL /HPF
STRESS TARGET HR: 118 BPM
TROPONIN I SERPL-MCNC: 0.08 NG/ML (ref 0–0.03)
TROPONIN I SERPL-MCNC: 0.08 NG/ML (ref 0–0.03)
UROBILINOGEN UR QL STRIP: ABNORMAL
WBC NRBC COR # BLD: 10.05 10*3/MM3 (ref 4.8–10.8)
WBC NRBC COR # BLD: 6.22 10*3/MM3 (ref 3.4–10.8)
WBC NRBC COR # BLD: 6.57 10*3/MM3 (ref 4.8–10.8)
WBC NRBC COR # BLD: 7.32 10*3/MM3 (ref 3.4–10.8)
WBC NRBC COR # BLD: 8.17 10*3/MM3 (ref 4.8–10.8)
WBC UR QL AUTO: ABNORMAL /HPF
WHOLE BLOOD HOLD SPECIMEN: NORMAL
WHOLE BLOOD HOLD SPECIMEN: NORMAL

## 2019-01-01 PROCEDURE — 99152 MOD SED SAME PHYS/QHP 5/>YRS: CPT | Performed by: INTERNAL MEDICINE

## 2019-01-01 PROCEDURE — 87077 CULTURE AEROBIC IDENTIFY: CPT | Performed by: NURSE PRACTITIONER

## 2019-01-01 PROCEDURE — 93288 INTERROG EVL PM/LDLS PM IP: CPT | Performed by: INTERNAL MEDICINE

## 2019-01-01 PROCEDURE — 93010 ELECTROCARDIOGRAM REPORT: CPT | Performed by: INTERNAL MEDICINE

## 2019-01-01 PROCEDURE — G0378 HOSPITAL OBSERVATION PER HR: HCPCS

## 2019-01-01 PROCEDURE — 71045 X-RAY EXAM CHEST 1 VIEW: CPT

## 2019-01-01 PROCEDURE — 85610 PROTHROMBIN TIME: CPT | Performed by: INTERNAL MEDICINE

## 2019-01-01 PROCEDURE — 33229 REMV&REPLC PM GEN MULT LEADS: CPT | Performed by: INTERNAL MEDICINE

## 2019-01-01 PROCEDURE — 80048 BASIC METABOLIC PNL TOTAL CA: CPT | Performed by: INTERNAL MEDICINE

## 2019-01-01 PROCEDURE — 25010000002 MIDAZOLAM PER 1 MG: Performed by: INTERNAL MEDICINE

## 2019-01-01 PROCEDURE — 99153 MOD SED SAME PHYS/QHP EA: CPT | Performed by: INTERNAL MEDICINE

## 2019-01-01 PROCEDURE — 87186 SC STD MICRODIL/AGAR DIL: CPT | Performed by: NURSE PRACTITIONER

## 2019-01-01 PROCEDURE — 80053 COMPREHEN METABOLIC PANEL: CPT | Performed by: EMERGENCY MEDICINE

## 2019-01-01 PROCEDURE — 94799 UNLISTED PULMONARY SVC/PX: CPT

## 2019-01-01 PROCEDURE — 85610 PROTHROMBIN TIME: CPT | Performed by: EMERGENCY MEDICINE

## 2019-01-01 PROCEDURE — 70496 CT ANGIOGRAPHY HEAD: CPT

## 2019-01-01 PROCEDURE — G0379 DIRECT REFER HOSPITAL OBSERV: HCPCS

## 2019-01-01 PROCEDURE — 80162 ASSAY OF DIGOXIN TOTAL: CPT | Performed by: EMERGENCY MEDICINE

## 2019-01-01 PROCEDURE — 81001 URINALYSIS AUTO W/SCOPE: CPT | Performed by: NURSE PRACTITIONER

## 2019-01-01 PROCEDURE — 84484 ASSAY OF TROPONIN QUANT: CPT | Performed by: INTERNAL MEDICINE

## 2019-01-01 PROCEDURE — 99285 EMERGENCY DEPT VISIT HI MDM: CPT

## 2019-01-01 PROCEDURE — 83735 ASSAY OF MAGNESIUM: CPT | Performed by: INTERNAL MEDICINE

## 2019-01-01 PROCEDURE — 83880 ASSAY OF NATRIURETIC PEPTIDE: CPT | Performed by: INTERNAL MEDICINE

## 2019-01-01 PROCEDURE — 94760 N-INVAS EAR/PLS OXIMETRY 1: CPT

## 2019-01-01 PROCEDURE — 93005 ELECTROCARDIOGRAM TRACING: CPT | Performed by: INTERNAL MEDICINE

## 2019-01-01 PROCEDURE — 93306 TTE W/DOPPLER COMPLETE: CPT

## 2019-01-01 PROCEDURE — 80053 COMPREHEN METABOLIC PANEL: CPT | Performed by: INTERNAL MEDICINE

## 2019-01-01 PROCEDURE — 85025 COMPLETE CBC W/AUTO DIFF WBC: CPT | Performed by: EMERGENCY MEDICINE

## 2019-01-01 PROCEDURE — 82962 GLUCOSE BLOOD TEST: CPT

## 2019-01-01 PROCEDURE — 84484 ASSAY OF TROPONIN QUANT: CPT | Performed by: NURSE PRACTITIONER

## 2019-01-01 PROCEDURE — 85027 COMPLETE CBC AUTOMATED: CPT | Performed by: INTERNAL MEDICINE

## 2019-01-01 PROCEDURE — C2621 PMKR, OTHER THAN SING/DUAL: HCPCS | Performed by: INTERNAL MEDICINE

## 2019-01-01 PROCEDURE — 70498 CT ANGIOGRAPHY NECK: CPT

## 2019-01-01 PROCEDURE — 0 IOPAMIDOL PER 1 ML: Performed by: EMERGENCY MEDICINE

## 2019-01-01 PROCEDURE — 99219 PR INITIAL OBSERVATION CARE/DAY 50 MINUTES: CPT | Performed by: INTERNAL MEDICINE

## 2019-01-01 PROCEDURE — 25010000002 VANCOMYCIN 1 G RECONSTITUTED SOLUTION: Performed by: INTERNAL MEDICINE

## 2019-01-01 PROCEDURE — 25010000002 FENTANYL CITRATE (PF) 100 MCG/2ML SOLUTION: Performed by: INTERNAL MEDICINE

## 2019-01-01 PROCEDURE — 83880 ASSAY OF NATRIURETIC PEPTIDE: CPT | Performed by: NURSE PRACTITIONER

## 2019-01-01 PROCEDURE — 33264 RMVL & RPLCMT DFB GEN MLT LD: CPT | Performed by: INTERNAL MEDICINE

## 2019-01-01 PROCEDURE — 99213 OFFICE O/P EST LOW 20 MIN: CPT | Performed by: NURSE PRACTITIONER

## 2019-01-01 PROCEDURE — 99285 EMERGENCY DEPT VISIT HI MDM: CPT | Performed by: PSYCHIATRY & NEUROLOGY

## 2019-01-01 PROCEDURE — 70450 CT HEAD/BRAIN W/O DYE: CPT

## 2019-01-01 PROCEDURE — 87086 URINE CULTURE/COLONY COUNT: CPT | Performed by: NURSE PRACTITIONER

## 2019-01-01 PROCEDURE — 93005 ELECTROCARDIOGRAM TRACING: CPT | Performed by: EMERGENCY MEDICINE

## 2019-01-01 PROCEDURE — 84100 ASSAY OF PHOSPHORUS: CPT | Performed by: INTERNAL MEDICINE

## 2019-01-01 PROCEDURE — 80053 COMPREHEN METABOLIC PANEL: CPT | Performed by: NURSE PRACTITIONER

## 2019-01-01 PROCEDURE — 80048 BASIC METABOLIC PNL TOTAL CA: CPT | Performed by: NURSE PRACTITIONER

## 2019-01-01 PROCEDURE — 93306 TTE W/DOPPLER COMPLETE: CPT | Performed by: INTERNAL MEDICINE

## 2019-01-01 PROCEDURE — 99204 OFFICE O/P NEW MOD 45 MIN: CPT | Performed by: OTOLARYNGOLOGY

## 2019-01-01 PROCEDURE — 85025 COMPLETE CBC W/AUTO DIFF WBC: CPT | Performed by: NURSE PRACTITIONER

## 2019-01-01 DEVICE — GEN PM PERCEPTA MRI CRTP: Type: IMPLANTABLE DEVICE | Status: FUNCTIONAL

## 2019-01-01 DEVICE — ENV PM AIGISRX ANTIBAC RESORB 2.9X3.3IN LG: Type: IMPLANTABLE DEVICE | Status: FUNCTIONAL

## 2019-01-01 RX ORDER — ONDANSETRON 2 MG/ML
4 INJECTION INTRAMUSCULAR; INTRAVENOUS EVERY 6 HOURS PRN
Status: DISCONTINUED | OUTPATIENT
Start: 2019-01-01 | End: 2019-01-01 | Stop reason: HOSPADM

## 2019-01-01 RX ORDER — CEPHALEXIN 250 MG/1
250 CAPSULE ORAL 4 TIMES DAILY
Qty: 4 CAPSULE | Refills: 0 | Status: SHIPPED | OUTPATIENT
Start: 2019-01-01 | End: 2019-01-01

## 2019-01-01 RX ORDER — ONDANSETRON 2 MG/ML
4 INJECTION INTRAMUSCULAR; INTRAVENOUS EVERY 6 HOURS PRN
Status: CANCELLED | OUTPATIENT
Start: 2019-01-01

## 2019-01-01 RX ORDER — MIDAZOLAM HYDROCHLORIDE 1 MG/ML
INJECTION INTRAMUSCULAR; INTRAVENOUS AS NEEDED
Status: DISCONTINUED | OUTPATIENT
Start: 2019-01-01 | End: 2019-01-01 | Stop reason: HOSPADM

## 2019-01-01 RX ORDER — VANCOMYCIN HYDROCHLORIDE 1 G/200ML
1000 INJECTION, SOLUTION INTRAVENOUS
Status: CANCELLED | OUTPATIENT
Start: 2019-01-01

## 2019-01-01 RX ORDER — SODIUM CHLORIDE 0.9 % (FLUSH) 0.9 %
10 SYRINGE (ML) INJECTION AS NEEDED
Status: DISCONTINUED | OUTPATIENT
Start: 2019-01-01 | End: 2019-01-01 | Stop reason: HOSPADM

## 2019-01-01 RX ORDER — SODIUM CHLORIDE 0.9 % (FLUSH) 0.9 %
3-10 SYRINGE (ML) INJECTION AS NEEDED
Status: DISCONTINUED | OUTPATIENT
Start: 2019-01-01 | End: 2019-01-01 | Stop reason: HOSPADM

## 2019-01-01 RX ORDER — DIGOXIN 125 MCG
125 TABLET ORAL
Status: DISCONTINUED | OUTPATIENT
Start: 2019-01-01 | End: 2019-01-01 | Stop reason: HOSPADM

## 2019-01-01 RX ORDER — SODIUM CHLORIDE 0.9 % (FLUSH) 0.9 %
3 SYRINGE (ML) INJECTION EVERY 12 HOURS SCHEDULED
Status: CANCELLED | OUTPATIENT
Start: 2019-01-01

## 2019-01-01 RX ORDER — CARVEDILOL 25 MG/1
25 TABLET ORAL DAILY
Status: DISCONTINUED | OUTPATIENT
Start: 2019-01-01 | End: 2019-01-01 | Stop reason: HOSPADM

## 2019-01-01 RX ORDER — ECHINACEA PURPUREA EXTRACT 125 MG
2 TABLET ORAL AS NEEDED
Status: DISCONTINUED | OUTPATIENT
Start: 2019-01-01 | End: 2019-01-01 | Stop reason: HOSPADM

## 2019-01-01 RX ORDER — HYDROCODONE BITARTRATE AND ACETAMINOPHEN 5; 325 MG/1; MG/1
1 TABLET ORAL EVERY 4 HOURS PRN
Status: DISCONTINUED | OUTPATIENT
Start: 2019-01-01 | End: 2019-01-01 | Stop reason: HOSPADM

## 2019-01-01 RX ORDER — CEPHALEXIN 250 MG/1
250 CAPSULE ORAL EVERY 6 HOURS SCHEDULED
Qty: 37 CAPSULE | Refills: 0 | Status: SHIPPED | OUTPATIENT
Start: 2019-01-01 | End: 2019-01-01

## 2019-01-01 RX ORDER — ECHINACEA PURPUREA EXTRACT 125 MG
2 TABLET ORAL AS NEEDED
Refills: 12 | Status: ON HOLD
Start: 2019-01-01 | End: 2019-01-01

## 2019-01-01 RX ORDER — SODIUM CHLORIDE 0.9 % (FLUSH) 0.9 %
3 SYRINGE (ML) INJECTION EVERY 12 HOURS SCHEDULED
Status: DISCONTINUED | OUTPATIENT
Start: 2019-01-01 | End: 2019-01-01 | Stop reason: HOSPADM

## 2019-01-01 RX ORDER — FUROSEMIDE 20 MG/1
20 TABLET ORAL
Status: DISCONTINUED | OUTPATIENT
Start: 2019-01-01 | End: 2019-01-01 | Stop reason: HOSPADM

## 2019-01-01 RX ORDER — FUROSEMIDE 20 MG/1
20 TABLET ORAL 2 TIMES DAILY
COMMUNITY

## 2019-01-01 RX ORDER — ACETAMINOPHEN 325 MG/1
650 TABLET ORAL EVERY 4 HOURS PRN
Status: DISCONTINUED | OUTPATIENT
Start: 2019-01-01 | End: 2019-01-01 | Stop reason: HOSPADM

## 2019-01-01 RX ORDER — FENTANYL CITRATE 50 UG/ML
INJECTION, SOLUTION INTRAMUSCULAR; INTRAVENOUS AS NEEDED
Status: DISCONTINUED | OUTPATIENT
Start: 2019-01-01 | End: 2019-01-01 | Stop reason: HOSPADM

## 2019-01-01 RX ORDER — CEPHALEXIN 250 MG/1
250 CAPSULE ORAL EVERY 6 HOURS SCHEDULED
Status: DISCONTINUED | OUTPATIENT
Start: 2019-01-01 | End: 2019-01-01 | Stop reason: HOSPADM

## 2019-01-01 RX ORDER — HYDROCODONE BITARTRATE AND ACETAMINOPHEN 5; 325 MG/1; MG/1
1 TABLET ORAL EVERY 4 HOURS PRN
Qty: 18 TABLET | Refills: 0 | Status: ON HOLD | OUTPATIENT
Start: 2019-01-01 | End: 2019-01-01

## 2019-01-01 RX ORDER — LIDOCAINE HYDROCHLORIDE 20 MG/ML
INJECTION, SOLUTION INFILTRATION; PERINEURAL AS NEEDED
Status: DISCONTINUED | OUTPATIENT
Start: 2019-01-01 | End: 2019-01-01 | Stop reason: HOSPADM

## 2019-01-01 RX ORDER — ENALAPRIL MALEATE 10 MG/1
20 TABLET ORAL 2 TIMES DAILY
Status: DISCONTINUED | OUTPATIENT
Start: 2019-01-01 | End: 2019-01-01 | Stop reason: HOSPADM

## 2019-01-01 RX ORDER — SODIUM CHLORIDE 0.9 % (FLUSH) 0.9 %
3-10 SYRINGE (ML) INJECTION AS NEEDED
Status: CANCELLED | OUTPATIENT
Start: 2019-01-01

## 2019-01-01 RX ORDER — HYDROCODONE BITARTRATE AND ACETAMINOPHEN 10; 325 MG/1; MG/1
1 TABLET ORAL EVERY 4 HOURS PRN
Status: DISCONTINUED | OUTPATIENT
Start: 2019-01-01 | End: 2019-01-01 | Stop reason: HOSPADM

## 2019-01-01 RX ORDER — WARFARIN SODIUM 5 MG/1
5 TABLET ORAL
COMMUNITY

## 2019-01-01 RX ADMIN — MUPIROCIN: 20 OINTMENT TOPICAL at 06:41

## 2019-01-01 RX ADMIN — CEPHALEXIN 250 MG: 250 CAPSULE ORAL at 06:41

## 2019-01-01 RX ADMIN — HYDROCODONE BITARTRATE AND ACETAMINOPHEN 1 TABLET: 5; 325 TABLET ORAL at 22:04

## 2019-01-01 RX ADMIN — CEPHALEXIN 250 MG: 250 CAPSULE ORAL at 01:41

## 2019-01-01 RX ADMIN — SODIUM CHLORIDE, PRESERVATIVE FREE 3 ML: 5 INJECTION INTRAVENOUS at 20:31

## 2019-01-01 RX ADMIN — DIGOXIN 125 MCG: 125 TABLET ORAL at 11:40

## 2019-01-01 RX ADMIN — CARVEDILOL 25 MG: 25 TABLET, FILM COATED ORAL at 09:29

## 2019-01-01 RX ADMIN — HYDROCODONE BITARTRATE AND ACETAMINOPHEN 1 TABLET: 5; 325 TABLET ORAL at 17:11

## 2019-01-01 RX ADMIN — HYDROCODONE BITARTRATE AND ACETAMINOPHEN 1 TABLET: 5; 325 TABLET ORAL at 01:46

## 2019-01-01 RX ADMIN — ENALAPRIL MALEATE 20 MG: 10 TABLET ORAL at 09:29

## 2019-01-01 RX ADMIN — CEPHALEXIN 250 MG: 250 CAPSULE ORAL at 11:40

## 2019-01-01 RX ADMIN — ENALAPRIL MALEATE 20 MG: 10 TABLET ORAL at 20:30

## 2019-01-01 RX ADMIN — SODIUM CHLORIDE, PRESERVATIVE FREE 3 ML: 5 INJECTION INTRAVENOUS at 09:29

## 2019-01-01 RX ADMIN — VANCOMYCIN HYDROCHLORIDE 1000 MG: 1 INJECTION, POWDER, LYOPHILIZED, FOR SOLUTION INTRAVENOUS at 12:07

## 2019-01-01 RX ADMIN — IOPAMIDOL 125 ML: 755 INJECTION, SOLUTION INTRAVENOUS at 09:09

## 2019-01-01 RX ADMIN — HYDROCODONE BITARTRATE AND ACETAMINOPHEN 1 TABLET: 10; 325 TABLET ORAL at 11:40

## 2019-01-01 RX ADMIN — FUROSEMIDE 20 MG: 20 TABLET ORAL at 09:29

## 2019-01-01 RX ADMIN — CEPHALEXIN 250 MG: 250 CAPSULE ORAL at 20:29

## 2019-01-01 RX ADMIN — MUPIROCIN: 20 OINTMENT TOPICAL at 22:04

## 2019-03-11 PROBLEM — S00.03XA SCALP HEMATOMA, INITIAL ENCOUNTER: Status: ACTIVE | Noted: 2019-01-01

## 2019-03-11 PROBLEM — I50.22 CHRONIC SYSTOLIC CHF (CONGESTIVE HEART FAILURE) (HCC): Status: ACTIVE | Noted: 2018-04-19

## 2019-03-11 PROBLEM — S05.12XA: Status: ACTIVE | Noted: 2019-01-01

## 2019-03-11 PROBLEM — I48.0 PAF (PAROXYSMAL ATRIAL FIBRILLATION) (HCC): Chronic | Status: ACTIVE | Noted: 2019-01-01

## 2019-03-11 PROBLEM — Z79.01 ANTICOAGULATED ON COUMADIN: Chronic | Status: ACTIVE | Noted: 2019-01-01

## 2019-03-11 PROBLEM — S02.85XA CLOSED FRACTURE OF ORBITAL WALL (HCC): Status: ACTIVE | Noted: 2019-01-01

## 2019-03-11 PROBLEM — S02.401A MAXILLARY SINUS FRACTURE, CLOSED, INITIAL ENCOUNTER (HCC): Status: ACTIVE | Noted: 2019-01-01

## 2019-03-11 PROBLEM — R93.0: Status: ACTIVE | Noted: 2019-01-01

## 2019-03-11 PROBLEM — I42.8 NON-ISCHEMIC CARDIOMYOPATHY (HCC): Chronic | Status: ACTIVE | Noted: 2019-01-01

## 2019-03-11 PROBLEM — S02.32XA CLOSED FRACTURE OF LEFT ORBITAL FLOOR (HCC): Status: ACTIVE | Noted: 2019-01-01

## 2019-03-11 PROBLEM — I50.22 CHRONIC SYSTOLIC CHF (CONGESTIVE HEART FAILURE) (HCC): Chronic | Status: ACTIVE | Noted: 2018-04-19

## 2019-03-11 PROBLEM — R55 SYNCOPE AND COLLAPSE: Status: ACTIVE | Noted: 2019-01-01

## 2019-03-11 PROBLEM — R77.8 ELEVATED TROPONIN: Status: ACTIVE | Noted: 2019-01-01

## 2019-03-11 PROBLEM — S02.40FD CLOSED FRACTURE OF LEFT ZYGOMATIC ARCH WITH ROUTINE HEALING: Status: ACTIVE | Noted: 2019-01-01

## 2019-03-11 NOTE — CONSULTS
ENT/[facility]PR[facility] ([place]) Consult Note:    Referring Provider: Yogi Diaz DO    Reason for Consultation: Facial fractures    Patient Care Team:  Abdelrahman Regalado MD as PCP - General  Abdelrahman Regalado MD as PCP - Family Medicine    Chief complaint: Facial fractures    Subjective .     History of present illness:  I have been asked to consult on Lisa Latham who is a 79 y.o. female had an unwitnessed syncopal episode at home. Due to this and the injuries sustained she was transferred from [facility] to [facility] for further treatment. The injuries involve the left side of her face and left orbit. Appears coumadin is being held. Ophthalmology consult pending.            Review of Systems  Review of Systems   Constitutional: Negative for activity change, appetite change, chills, diaphoresis, fatigue, fever and unexpected weight change.   HENT: Positive for facial swelling. Negative for congestion, dental problem, drooling, ear discharge, ear pain, hearing loss, mouth sores, nosebleeds, postnasal drip, rhinorrhea, sinus pressure, sneezing, sore throat, tinnitus, trouble swallowing and voice change.    Eyes: Positive for pain and visual disturbance.   Respiratory: Positive for shortness of breath (chronic).    Cardiovascular: Negative.    Gastrointestinal: Negative.    Endocrine: Negative.    Skin: Negative.    Allergic/Immunologic: Negative for environmental allergies, food allergies and immunocompromised state.   Neurological: Negative.    Hematological: Negative.    Psychiatric/Behavioral: Negative.        History  Past Medical History:   Diagnosis Date   • A-fib (CMS/HCC)    • CHF (congestive heart failure) (CMS/HCC)    • Coronary artery disease    • HOCM (hypertrophic obstructive cardiomyopathy) (CMS/HCC)    • Hypertension    ,   Past Surgical History:   Procedure Laterality Date   • CARDIAC CATHETERIZATION N/A 4/18/2018    Procedure: Left Heart Cath;  Surgeon: Iván Ramirez MD;  Location: [facility]  INVASIVE LOCATION;  Service: Cardiology   • PACEMAKER IMPLANTATION     ,   Family History   Problem Relation Age of Onset   • Coronary artery disease Mother    • Coronary artery disease Maternal Grandmother    ,   Social History     Tobacco Use   • Smoking status: Never Smoker   • Smokeless tobacco: Never Used   Substance Use Topics   • Alcohol use: No   • Drug use: No   ,   Medications Prior to Admission   Medication Sig Dispense Refill Last Dose   • carvedilol (COREG) 25 MG tablet Take 25 mg by mouth Daily.   3/11/2019 at Unknown time   • digoxin (LANOXIN) 125 MCG tablet Take 125 mcg by mouth Daily.   3/11/2019 at Unknown time   • enalapril (VASOTEC) 20 MG tablet Take 20 mg by mouth 2 (Two) Times a Day.   3/11/2019 at Unknown time   • furosemide (LASIX) 20 MG tablet Take 20 mg by mouth 2 (Two) Times a Day. PER PRESCRIPTION ON MEDICATION BOTTLE: TAKE 1 TABLET ON EVEN DAYS,  TWO ON ODD DAYS. PT TOOK TWO THIS AM   3/11/2019 at Unknown time   • potassium chloride (K-DUR) 10 MEQ CR tablet Take 20 mEq by mouth Daily.   3/11/2019 at Unknown time   • warfarin (COUMADIN) 5 MG tablet Take 5 mg by mouth Daily.   3/11/2019 at Unknown time    and Allergies:  Patient has no known allergies.    Objective     Vital Signs   Temp:  [98.5 °F (36.9 °C)] 98.5 °F (36.9 °C)  Heart Rate:  [68] 68  Resp:  [20] 20  BP: (156)/(86) 156/86    Physical Exam:   Physical Exam   Constitutional: Vital signs are normal. She appears well-developed and well-nourished. No distress.   HENT:   Head: Normocephalic. Head is with contusion.       Right Ear: Hearing, tympanic membrane, external ear and ear canal normal.   Left Ear: Hearing, tympanic membrane, external ear and ear canal normal.   Nose: No mucosal edema, rhinorrhea, nose lacerations, sinus tenderness, nasal deformity, septal deviation or nasal septal hematoma. No epistaxis.   Mouth/Throat: Uvula is midline, oropharynx is clear and moist and mucous membranes are normal. No oral lesions. No  trismus in the jaw. No uvula swelling, lacerations or dental caries. No oropharyngeal exudate, posterior oropharyngeal edema, posterior oropharyngeal erythema or tonsillar abscesses. No tonsillar exudate.   Eyes: Conjunctivae and EOM are normal. Pupils are equal, round, and reactive to light. No scleral icterus.       Neck: Trachea normal and phonation normal. No tracheal tenderness present. No tracheal deviation present. No thyroid mass and no thyromegaly present.   Cardiovascular: Normal rate.   Pulmonary/Chest: Effort normal and breath sounds normal. No stridor. No respiratory distress. She has no wheezes.   Lymphadenopathy:     She has no cervical adenopathy.   Neurological: She is alert. No cranial nerve deficit.   Skin: Skin is warm, dry and intact. No rash noted. She is not diaphoretic. No erythema. No pallor.   Psychiatric: She has a normal mood and affect. Her speech is normal and behavior is normal. Judgment and thought content normal. Cognition and memory are normal.   Vitals reviewed.      Results Review:     Lab Results (last 24 hours)     Procedure Component Value Units Date/Time    BNP [[id number]]  (Abnormal) Collected:  03/11/19 1651    Specimen:  Blood Updated:  03/11/19 1734     proBNP 7,040.0 pg/mL     Troponin [[id number]]  (Abnormal) Collected:  03/11/19 1651    Specimen:  Blood Updated:  03/11/19 1734     Troponin I 0.076 ng/mL     Comprehensive Metabolic Panel [[id number]]  (Abnormal) Collected:  03/11/19 1651    Specimen:  Blood Updated:  03/11/19 1721     Glucose 132 mg/dL      BUN 16 mg/dL      Creatinine 0.61 mg/dL      Sodium 140 mmol/L      Potassium 4.2 mmol/L      Chloride 102 mmol/L      CO2 28.0 mmol/L      Calcium 9.9 mg/dL      Total Protein 7.2 g/dL      Albumin 4.80 g/dL      ALT (SGPT) 26 U/L      AST (SGOT) 29 U/L      Alkaline Phosphatase 94 U/L      Total Bilirubin 1.3 mg/dL      eGFR Non African Amer 94 mL/min/1.73      Globulin 2.4 gm/dL      A/G Ratio 2.0 g/dL       BUN/Creatinine Ratio 26.2     Anion Gap 10.0 mmol/L     Narrative:       GFR Normal >60  Chronic Kidney Disease <60  Kidney Failure <15    Magnesium [[id number]]  (Normal) Collected:  03/11/19 1651    Specimen:  Blood Updated:  03/11/19 1721     Magnesium 1.8 mg/dL     Phosphorus [[id number]]  (Normal) Collected:  03/11/19 1651    Specimen:  Blood Updated:  03/11/19 1721     Phosphorus 3.5 mg/dL     Protime-INR [[id number]]  (Abnormal) Collected:  03/11/19 1651    Specimen:  Blood Updated:  03/11/19 1718     Protime 19.4 Seconds      INR 1.58    CBC (No Diff) [[id number]]  (Abnormal) Collected:  03/11/19 1651    Specimen:  Blood Updated:  03/11/19 1709     WBC 10.05 10*3/mm3      RBC 4.09 10*6/mm3      Hemoglobin 13.7 g/dL      Hematocrit 40.0 %      MCV 97.8 fL      MCH 33.5 pg      MCHC 34.3 g/dL      RDW 13.5 %      RDW-SD 48.4 fl      MPV 11.6 fL      Platelets 160 10*3/mm3          Imaging Results (last 24 hours)     ** No results found for the last 24 hours. **          I have personally reviewed the CT scan of the facial bones.  The following is my interpretation: Left periorbital hematoma with small left orbital hematoma. Minimally displaced fracture of the left orbital floor and anterior, posterior, and lateral wall of the maxillary sinus. Near complete opacification of the left maxillary sinus. Mildly displaced fracture of the left zygomatic arch. Minimally displaced fracture of the left lateral orbital wall    Assessment/Plan       Syncope and collapse    Chronic systolic CHF (congestive heart failure) (CMS/[facility])    Elevated troponin    PAF (paroxysmal atrial fibrillation) (CMS/[facility])    Anticoagulated on Coumadin    Traumatic hematoma of left orbit    Closed fracture of left orbital floor (CMS/[facility])    Scalp hematoma, initial encounter    Non-ischemic cardiomyopathy (CMS/[facility])    Closed fracture of left zygomatic arch with routine healing    Maxillary sinus fracture, closed, initial encounter (CMS/[facility])     Opacification of left maxillary sinus    Closed fracture of orbital wall (CMS/HCC)      Will monitor at this time and advised ophthalmology consult. Will start Keflex, Ocean spray, and bactroban. Will discuss results with Dr. Hylton, but fractures appear nonsurgical at this time.    I discussed the patients findings and my recommendations with patient, family and nursing staff    RICHIE Mercer  03/11/19  6:26 PM

## 2019-03-11 NOTE — PLAN OF CARE
Problem: Fall Risk (Adult)  Goal: Identify Related Risk Factors and Signs and Symptoms  Outcome: Ongoing (interventions implemented as appropriate)   03/11/19 1634   Fall Risk (Adult)   Related Risk Factors (Fall Risk) age-related changes;culprit medication(s);history of falls;homeostatic imbalance;impaired vision;environment unfamiliar   Signs and Symptoms (Fall Risk) presence of risk factors     Goal: Absence of Fall  Outcome: Ongoing (interventions implemented as appropriate)   03/11/19 1634   Fall Risk (Adult)   Absence of Fall making progress toward outcome       Problem: Patient Care Overview  Goal: Plan of Care Review  Outcome: Ongoing (interventions implemented as appropriate)   03/11/19 1634   Coping/Psychosocial   Plan of Care Reviewed With patient;family   Plan of Care Review   Progress no change   OTHER   Outcome Summary PATIENT ADMITTED TO  FROM [facility] WITH SYNCOPAL EPISODE, PT FELL AT HOME AND HIT FACE. LEFT EYE SWOLLEN/BRUISED. PT DENIES PAIN AT THIS TIME. VSS. ADMISSION ASSESSMENTS COMPLETED. WAITING FOR FURTHER MD ORDERS. CONTINUE TO MONITOR.     Goal: Individualization and Mutuality  Outcome: Ongoing (interventions implemented as appropriate)   03/11/19 1634   Individualization   Patient Specific Goals (Include Timeframe) NO FALLS   Patient Specific Interventions SAFETY PRECAUTIONS   Mutuality/Individual Preferences   What Anxieties, Fears, Concerns, or Questions Do You Have About Your Care? NONE       Problem: Syncope (Adult)  Goal: Identify Related Risk Factors and Signs and Symptoms  Outcome: Ongoing (interventions implemented as appropriate)   03/11/19 1634   Syncope (Adult)   Signs and Symptoms (Syncope) other (see comments)     Goal: Physical Safety/Health Maintenance  Outcome: Ongoing (interventions implemented as appropriate)   03/11/19 1634   Syncope (Adult)   Physical Safety/Health Maintenance making progress toward outcome     Goal: Optimal Emotional/Functional [place]  Outcome:  Ongoing (interventions implemented as appropriate)   03/11/19 1634   Syncope (Adult)   Optimal Emotional/Functional [place] making progress toward outcome

## 2019-03-11 NOTE — H&P
"    [facility] Medicine Services  HISTORY AND PHYSICAL    Date of Admission: 3/11/2019  Primary Care Physician: Abdelarhman Regalado MD    Subjective     Chief Complaint: syncope, fall    History of Present Illness  This is an 79-year-old  female patient who resides in [place], [place].  She sees Dr. Abdelrahman Regalado for primary care.  She has also previously seen Dr. ruiz.  She has a history of atrial fibrillation chronically anticoagulated with Coumadin.  She has a history of chronic systolic heart failure with an ejection fraction of 40-45% in April 2018.  She does have a biventricular PPM placed by Dr. Monsivais.    She was originally seen at [facility] today.  She was sent here in light of the slight troponin elevation and her cardiac history.    She tells me that she went out into her front yard today to let her dog use the bathroom.  She was climbing the steps to go back in and became dyspneic and fatigued.  She was about to reach the last step and basically \"gave out.\"  She feels that she lost consciousness and woke up with blood coming from her left eye and it being very swollen.  Her son lives in the apartment above her and she managed to reach his door.  He contacted EMS.    She denies recent problems with dizziness, lightheadedness, near syncope.    She has chronic dyspnea on exertion secondary to her heart failure.  She does not feel that this is been any worse than usual.  Her legs are now more swollen than usual.  She denies chest discomfort.  She does not wear a sleep device for home oxygen.  She has never smoked.  She has not had a cough.  No fever, sweats, or chills.    She obviously complains of discomfort around her left eye at this point.  It is completely swollen shut.  No current bleeding.    Today also happens to be her 79st birthday.    Review of Systems   Otherwise complete ROS reviewed and negative except as mentioned in the HPI.    Past " Medical History:   Past Medical History:   Diagnosis Date   • A-fib (CMS/HCC)    • CHF (congestive heart failure) (CMS/[facility])    • Coronary artery disease    • HOCM (hypertrophic obstructive cardiomyopathy) (CMS/[facility])    • Hypertension      Past Surgical History:  Past Surgical History:   Procedure Laterality Date   • CARDIAC CATHETERIZATION N/A 4/18/2018    Procedure: Left Heart Cath;  Surgeon: Iván Ramirez MD;  Location:  [facility] CATH INVASIVE LOCATION;  Service: Cardiology   • PACEMAKER IMPLANTATION       Social History:  reports that  has never smoked. she has never used smokeless tobacco. She reports that she does not drink alcohol or use drugs.    Family History: family history includes Coronary artery disease in her maternal grandmother and mother.       Allergies:  No Known Allergies  Medications:  Prior to Admission medications    Medication Sig Start Date End Date Taking? Authorizing Provider   carvedilol (COREG) 25 MG tablet Take 25 mg by mouth Daily.    Contreras Gonzalez MD   digoxin (LANOXIN) 125 MCG tablet Take 125 mcg by mouth Daily.    Contreras Gonzalez MD   enalapril (VASOTEC) 20 MG tablet Take 20 mg by mouth 2 (Two) Times a Day.    Contreras Gonzalez MD   Ergocalciferol (VITAMIN D2 PO) Take 50,000 Units by mouth 1 (One) Time Per Week.    Contreras Gonzalez MD   potassium chloride (K-DUR) 10 MEQ CR tablet Take 20 mEq by mouth Daily.    Contreras Gonzalez MD   warfarin (COUMADIN) 5 MG tablet Take 5 mg by mouth Daily.    Contreras Gonzalez MD     Objective     Vital Signs: /86 (BP Location: Right arm, Patient Position: Lying)   Pulse 68   Temp 98.5 °F (36.9 °C) (Oral)   Resp 20   Wt 61 kg (134 lb 8 oz)   SpO2 93%   BMI 23.83 kg/m²   Physical Exam   Constitutional: She is oriented to person, place, and time. She appears well-developed and well-nourished.   Up in bed.  No distress.  Talkative.  Her son and daughter present with her.  Discussed with her nurse, Tonya    HENT:   Head: Normocephalic.   Her left eye is circumferentially ecchymotic.  It is swollen shut.  I cannot examine the globe.   Eyes: Conjunctivae and EOM are normal. Pupils are equal, round, and reactive to light.   Neck: Neck supple. No JVD present.   Cardiovascular: Normal rate, regular rhythm, normal heart sounds and intact distal pulses. Exam reveals no gallop and no friction rub.   No murmur heard.  Pulmonary/Chest: Effort normal and breath sounds normal. No respiratory distress. She has no wheezes. She has no rales. She exhibits no tenderness.   Abdominal: Soft. Bowel sounds are normal. She exhibits no distension. There is no tenderness. There is no rebound and no guarding.   Musculoskeletal: Normal range of motion. She exhibits edema (trace with varicosities). She exhibits no tenderness or deformity.   Neurological: She is alert and oriented to person, place, and time. She displays normal reflexes. No cranial nerve deficit. She exhibits normal muscle tone.   Generally weak, but nonfocal.   Skin: Skin is warm and dry. No rash noted.   Psychiatric: She has a normal mood and affect. Her behavior is normal. Judgment and thought content normal.     Results Reviewed from [facility]:  1.  CBC showed a white blood cell count 6.9.  H&H 14.0 and 42.0 respectively.  Platelets 165,000.  2.  Chemistries show sodium of 140, potassium 3.8.  Chloride 105 bicarb 27.  BUN and creatinine 17 and 0.86 respectively.  Calcium 9.6.  Glucose 151.  Hepatic function panel within normal limits.  3.  PT and INR 17.4 and 1.8 respectively.  PTT normal.  4.  Initial troponin was 0.08 and the second troponin was 0.10 (0.02-0.05 is normal).  5.  CT scan of the head without contrast showed left periorbital hematoma and abnormal density in the left maxillary sinus with a fracture of the wall of the sinus.  Cerebral atrophy.  No hemorrhage mass or acute ischemic change identified in the brain.  6.  CT scan of the sinuses without contrast shows  left-sided facial fractures as described.  Left periorbital hematoma as well as a possible small hematoma in the left orbit.    I have personally reviewed and interpreted the radiology studies and ECG obtained at time of admission.     Assessment / Plan     Assessment:   Active Hospital Problems    Diagnosis   • **Syncope and collapse   • Elevated troponin   • PAF (paroxysmal atrial fibrillation) (CMS/HCC)   • Anticoagulated on Coumadin   • Traumatic hematoma of left orbit   • Closed fracture of left orbital floor (CMS/HCC)   • Scalp hematoma, initial encounter   • Non-ischemic cardiomyopathy (CMS/HCC)   • Chronic systolic CHF (congestive heart failure) (CMS/[facility])     Plan:   The patient is admitted to my service here at [facility].  She had an unwitnessed syncopal episode at home.  This is left her with injuries to the left side of her face and left orbit.  Hold Coumadin for now.  Repeat INR.  Reverse if necessary.  ENT consultation.  She may ultimately need ophthalmologic consultation. Ice packs.     Her troponin elevation is negligible.  I do not feel that cardiology has to be consulted out right.  I will trend this.  She had a left heart catheterization in April 2018 by Dr. Ramirez that showed near normal coronary arteries.  Obtain an echocardiogram as she has not had one since last April.  Interrogate her device.  Monitor orthostatic vital signs.  I think she had a syncopal episode secondary to exertion and fatigue.    Restart her appropriate home medications.  Continue to hold Coumadin as previously mentioned.    Code Status: Full.      I discussed the patient's findings and my recommendations with the patient and her children.     Estimated length of stay is at least overnight.     Yogi Diaz,    03/11/19   5:02 PM

## 2019-03-11 NOTE — NURSING NOTE
Patient's pacemaker is [employer]. Interrogated per nursing staff. Javi from [employer] called, stated he received the transmission and the device did not show any abnormalities.

## 2019-03-12 NOTE — PROGRESS NOTES
Device clinic received remote transmission ([employer]) for patient that was obtained while patient was inpatient at [facility].  Per APRN note during admission, patient's device is followed by Dr. Tobin at [facility].  RN verified with Madina, staff member at Dr. Murcia's office, that patient is indeed followed by them and has been having regular device checks with their clinic.  Battery is at < 3 months remaining--Per Madina, patient has device clinic appointment scheduled in April to monitor battery.  RN will remove patient from remote monitoring system for our clinic.

## 2019-03-12 NOTE — DISCHARGE SUMMARY
[facility]  DISCHARGE SUMMARY       Date of Admission: 3/11/2019  Date of Discharge:  3/12/2019  Primary Care Physician: Abdelrahman Regalado MD    Presenting Problem/History of Present Illness:  Syncope/fall    Final Discharge Diagnoses:  • **Syncope and collapse   • Elevated troponin   • PAF (paroxysmal atrial fibrillation) (CMS/HCC)   • Anticoagulated on Coumadin   • Traumatic hematoma of left orbit   • Closed fracture of left orbital floor (CMS/HCC)   • Scalp hematoma, initial encounter   • Non-ischemic cardiomyopathy (CMS/HCC)   • Chronic systolic CHF (congestive heart failure) (CMS/HCC) with worsening ejection fraction of 35%-hypertrophin obstructive cardiomyopathy      Consults:   1. Dr. Iván Ramirez-cardiology  2. Dr. Theron Ceja-opthamology  3. Dr. Yogi Hylton-ENT    Procedures Performed: none    Pertinent Test Results:   Imaging Results (last 7 days)     ** No results found for the last 168 hours. **        Lab Results (last 7 days)     Procedure Component Value Units Date/Time    Troponin [[id number]]  (Abnormal) Collected:  03/12/19 0606    Specimen:  Blood Updated:  03/12/19 0657     Troponin I 0.082 ng/mL     Protime-INR [[id number]]  (Abnormal) Collected:  03/12/19 0606    Specimen:  Blood Updated:  03/12/19 0647     Protime 19.5 Seconds      INR 1.59    Basic Metabolic Panel [[id number]]  (Abnormal) Collected:  03/12/19 0606    Specimen:  Blood Updated:  03/12/19 0645     Glucose 103 mg/dL      BUN 16 mg/dL      Creatinine 0.69 mg/dL      Sodium 140 mmol/L      Potassium 3.9 mmol/L      Chloride 102 mmol/L      CO2 29.0 mmol/L      Calcium 9.6 mg/dL      eGFR Non African Amer 82 mL/min/1.73      BUN/Creatinine Ratio 23.2     Anion Gap 9.0 mmol/L     Narrative:       GFR Normal >60  Chronic Kidney Disease <60  Kidney Failure <15    CBC (No Diff) [[id number]]  (Abnormal) Collected:  03/12/19 0606    Specimen:  Blood Updated:  03/12/19 0637      WBC 8.17 10*3/mm3      RBC 3.66 10*6/mm3      Hemoglobin 12.1 g/dL      Hematocrit 35.7 %      MCV 97.5 fL      MCH 33.1 pg      MCHC 33.9 g/dL      RDW 13.4 %      RDW-SD 47.8 fl      MPV 11.6 fL      Platelets 154 10*3/mm3     BNP [[id number]]  (Abnormal) Collected:  03/11/19 1651    Specimen:  Blood Updated:  03/11/19 1734     proBNP 7,040.0 pg/mL     Troponin [[id number]]  (Abnormal) Collected:  03/11/19 1651    Specimen:  Blood Updated:  03/11/19 1734     Troponin I 0.076 ng/mL     Comprehensive Metabolic Panel [[id number]]  (Abnormal) Collected:  03/11/19 1651    Specimen:  Blood Updated:  03/11/19 1721     Glucose 132 mg/dL      BUN 16 mg/dL      Creatinine 0.61 mg/dL      Sodium 140 mmol/L      Potassium 4.2 mmol/L      Chloride 102 mmol/L      CO2 28.0 mmol/L      Calcium 9.9 mg/dL      Total Protein 7.2 g/dL      Albumin 4.80 g/dL      ALT (SGPT) 26 U/L      AST (SGOT) 29 U/L      Alkaline Phosphatase 94 U/L      Total Bilirubin 1.3 mg/dL      eGFR Non African Amer 94 mL/min/1.73      Globulin 2.4 gm/dL      A/G Ratio 2.0 g/dL      BUN/Creatinine Ratio 26.2     Anion Gap 10.0 mmol/L     Narrative:       GFR Normal >60  Chronic Kidney Disease <60  Kidney Failure <15    Magnesium [[id number]]  (Normal) Collected:  03/11/19 1651    Specimen:  Blood Updated:  03/11/19 1721     Magnesium 1.8 mg/dL     Phosphorus [[id number]]  (Normal) Collected:  03/11/19 1651    Specimen:  Blood Updated:  03/11/19 1721     Phosphorus 3.5 mg/dL     Protime-INR [[id number]]  (Abnormal) Collected:  03/11/19 1651    Specimen:  Blood Updated:  03/11/19 1718     Protime 19.4 Seconds      INR 1.58    CBC (No Diff) [[id number]]  (Abnormal) Collected:  03/11/19 1651    Specimen:  Blood Updated:  03/11/19 1709     WBC 10.05 10*3/mm3      RBC 4.09 10*6/mm3      Hemoglobin 13.7 g/dL      Hematocrit 40.0 %      MCV 97.8 fL      MCH 33.5 pg      MCHC 34.3 g/dL      RDW 13.5 %      RDW-SD 48.4 fl      MPV 11.6 fL      Platelets 160 10*3/mm3      "    Hospital Course:  The patient is a 79 y.o. female who follows with Dr. Josue Regalado for primary care.  She has a past medical history significant for chronic systolic congestive heart failure, paroxysmal atrial fibrillation, obstructive cardiomyopathy, chronic anticoagulated with Coumadin, and a biventricular pacemaker placement.  Patient presented to the emergency department with complaints of syncope and fall.     States that she went to let her dog outside.  She was going up the steps to go back and became distant fatigue and when she awakened after falling she had blood coming from her eye.  Contacted EMS and presented to the emergency department.  In the emergency department at [facility], CT of the head reveals close fracture left orbital floor was also found to have a scalp hematoma.  The elevated troponin and was sent here given her cardiac history for further evaluation management.    Patient was seen in consultation by Dr. Denise with ear nose and throat not feel like her fractures were surgical.  Started on Keflex as a nasal spray and Bactroban intranasally as well.  She was also seen by Dr. Theron Ceja with ophthalmology who did not note any signs of ocular injury \"or muscle entrapment retrobulbar hematoma was small in the CT with no increased intraocular pressure.  He does need repeat vision and IOP in 2-3 days to ensure no progression of the hematoma.  He will see her in the office on Friday.    Repeat troponin this morning reveals still mildly elevated at 0.082 previously was 0.076.  Patient is not complaining of any chest pain.  Is not complaining of any changes of her shortness of breath.  2D echocardiogram was obtained which reveals lower ejection fraction of 35%.  Previous ejection fraction was 45%.  In addition, hit her pacemaker was interrogated not reveal any evidence of arrhythmias.    She was seen in consultation by Dr. Iván Ramirez with cardiology.  She " "apparently has been followed cardiology in [place] and they are doing her device interrogations.  She apparently has battery life of less than 3 months but has an outpatient follow-up in April.  No medication changes were made.  Patient will continue to hold her Coumadin until she is seen by ophthalmology on Friday.    Orthostatic vital signs were obtained she did not have any orthostatic hypotension.  Suspect that her syncope wasrelated to her chronic shortness of breath secondary to her chronic heart failure. She is very anxious to be discharged. She will require several follow ups including ophthalmology, ENT, cardiology in [place], and her PCP.     Physical Exam on Discharge:  /60 (BP Location: Left arm, Patient Position: Lying)   Pulse 63   Temp 98.3 °F (36.8 °C) (Oral)   Resp 20   Ht 165.1 cm (65\")   Wt 61 kg (134 lb 8 oz)   SpO2 92%   BMI 22.38 kg/m²   Physical Exam   Constitutional: She is oriented to person, place, and time. She appears well-developed and well-nourished.   HENT:   Head: Normocephalic and atraumatic.   Eyes: Conjunctivae and EOM are normal. Pupils are equal, round, and reactive to light.   eccymosis left eye. She is unable to open her eyelid.    Neck: Neck supple. No JVD present. No thyromegaly present.   Cardiovascular: Normal rate, regular rhythm, normal heart sounds and intact distal pulses. Exam reveals no gallop and no friction rub.   No murmur heard.  vpacing 60-64   Pulmonary/Chest: Effort normal and breath sounds normal. No respiratory distress. She has no wheezes. She has no rales. She exhibits no tenderness.   Abdominal: Soft. Bowel sounds are normal. She exhibits no distension. There is no tenderness. There is no rebound and no guarding.   Musculoskeletal: Normal range of motion. She exhibits no edema, tenderness or deformity.   Lymphadenopathy:     She has no cervical adenopathy.   Neurological: She is alert and oriented to person, place, and time. She displays " normal reflexes. No cranial nerve deficit. She exhibits normal muscle tone.   Skin: Skin is warm and dry. No rash noted.   Psychiatric: She has a normal mood and affect. Her behavior is normal. Judgment and thought content normal.     Condition on Discharge: stable.     Discharge Disposition:  Home or Self Care    Discharge Medications:     Discharge Medications      New Medications      Instructions Start Date   cephalexin 250 MG capsule  Commonly known as:  KEFLEX   250 mg, Oral, Every 6 Hours Scheduled      HYDROcodone-acetaminophen 5-325 MG per tablet  Commonly known as:  NORCO   1 tablet, Oral, Every 4 Hours PRN      mupirocin 2 % ointment  Commonly known as:  BACTROBAN   Topical, Every 8 Hours Scheduled      sodium chloride 0.65 % nasal spray  Commonly known as:  OCEAN   2 sprays, Nasal, As Needed         Continue These Medications      Instructions Start Date   carvedilol 25 MG tablet  Commonly known as:  COREG   25 mg, Oral, Daily      digoxin 125 MCG tablet  Commonly known as:  LANOXIN   125 mcg, Oral, Daily Digoxin      enalapril 20 MG tablet  Commonly known as:  VASOTEC   20 mg, Oral, 2 Times Daily      furosemide 20 MG tablet  Commonly known as:  LASIX   20 mg, Oral, 2 Times Daily, PER PRESCRIPTION ON MEDICATION BOTTLE: TAKE 1 TABLET ON EVEN DAYS,  TWO ON ODD DAYS. PT TOOK TWO THIS AM       potassium chloride 10 MEQ CR tablet  Commonly known as:  K-DUR   20 mEq, Oral, Daily         Stop These Medications    warfarin 5 MG tablet  Commonly known as:  COUMADIN          Discharge Diet:   Diet Instructions     Diet: Cardiac; Thin      Discharge Diet:  Cardiac    Fluid Consistency:  Thin        Activity at Discharge:   Activity Instructions     Activity as Tolerated            Follow-up Appointments:   Future Appointments   Date Time Provider Department Center   3/25/2019  1:15 PM Yogi Hylton MD [facility] None   1. Dr. Ceja on Friday    Test Results Pending at Discharge: none    Miranda Lopez  SHARYN Andrews  03/12/19  5:29 PM    Time: 25 minutes.     I personally evaluated and examined the patient in conjunction with SHARYN Mcgovern and agree with the assessment, treatment plan, and disposition of the patient as recorded by her. My history, exam, and further recommendations are:     Seen and discussed with her son and her daughter.    She continues to have left periorbital ecchymosis and swelling.  She cannot open her eyes yet.  She has been evaluated by Dr. Hylton and Dr. Patiño.  Otolaryngology believes her fractures will be nonsurgical.  Ophthalmology wants to see her in clinic on Friday.    We have discussed her holding her Coumadin until after she is reevaluated by ophthalmology.    Her daughter is taking off work for the rest of the week to be with her mother full-time.  They declined home health.    They will follow-up with Dr. Regalado next week and see ophthalmology on Friday.  There is an appointment to see Dr. Hylton on 3/25.  Cardiology saw her here and made no changes.  She is to follow-up with Dr. Tobin in [place].    Yogi Diaz DO  03/12/19  5:45 PM

## 2019-03-12 NOTE — CONSULTS
[facility] CONSULT NOTE    Referring Provider: Yogi Diaz DO    Reason for Consultation: decreased EF     Chief Complaint: syncope    Subjective .     History of present illness:  Lisa Latham is a 79 y.o. female with a known PMH significant for chronic systolic heart failure, hypertrophic cardiomyopathy, AF, Bi V Pacemaker.  She was home yesterday walking her dog outside when she became tired and short of breath, which is usual for her. She notes that when she gets exerts herself she tires easily. She usually gets to a chair on her porch to rest. She was trying to get up on the prorch to rest, yesterday, and felt exhausted. The next thing she knew she had fallen and was bleeding from her face. She made to to her son's apartment, which is next to hers and EMS was called. She denies any shortness of breath at present. She has been weighing daily at home. Her fatigue and declined stamina is not new. She is complaint with medications at home. She follows with [facility].     Her troponin is elevated. Her echo shows EF declined from 40- 45% to 35% this admission. Cardiology was asked to evaluated due to this change and discuss potential need for ICD.    History  Past Medical History:   Diagnosis Date   • A-fib (CMS/HCC)    • CHF (congestive heart failure) (CMS/HCC)    • Coronary artery disease    • HOCM (hypertrophic obstructive cardiomyopathy) (CMS/HCC)    • Hypertension    ,   Past Surgical History:   Procedure Laterality Date   • CARDIAC CATHETERIZATION N/A 4/18/2018    Procedure: Left Heart Cath;  Surgeon: Iván Ramirez MD;  Location: [facility] CATH INVASIVE LOCATION;  Service: Cardiology   • PACEMAKER IMPLANTATION     ,   Family History   Problem Relation Age of Onset   • Coronary artery disease Mother    • Coronary artery disease Maternal Grandmother    ,   Social History     Tobacco Use   • Smoking status: Never Smoker   • Smokeless tobacco: Never Used   Substance Use  Topics   • Alcohol use: No   • Drug use: No   ,     Medications  Current Facility-Administered Medications   Medication Dose Route Frequency Provider Last Rate Last Dose   • acetaminophen (TYLENOL) tablet 650 mg  650 mg Oral Q4H PRN Yogi Diaz DO       • carvedilol (COREG) tablet 25 mg  25 mg Oral Daily Yogi Diaz DO   25 mg at 03/12/19 0929   • cephalexin (KEFLEX) capsule 250 mg  250 mg Oral Q6H Ronnie Lopez PA   250 mg at 03/12/19 0641   • digoxin (LANOXIN) tablet 125 mcg  125 mcg Oral Daily Yogi Diaz DO       • enalapril (VASOTEC) tablet 20 mg  20 mg Oral BID Yogi Diaz DO   20 mg at 03/12/19 0929   • furosemide (LASIX) tablet 20 mg  20 mg Oral BID Yogi Diaz DO   20 mg at 03/12/19 0929   • HYDROcodone-acetaminophen (NORCO)  MG per tablet 1 tablet  1 tablet Oral Q4H PRN Yogi Diaz DO       • HYDROcodone-acetaminophen (NORCO) 5-325 MG per tablet 1 tablet  1 tablet Oral Q4H PRN Yogi Diaz DO   1 tablet at 03/12/19 0146   • mupirocin (BACTROBAN) 2 % ointment   Topical Q8H Ronnie Lopez PA       • ondansetron (ZOFRAN) injection 4 mg  4 mg Intravenous Q6H PRN Yogi Diaz DO       • sodium chloride (OCEAN) nasal spray 2 spray  2 spray Each Nare PRN Ronnie Lopez PA       • sodium chloride 0.9 % flush 3 mL  3 mL Intravenous Q12H Yogi Diaz DO   3 mL at 03/12/19 0929   • sodium chloride 0.9 % flush 3-10 mL  3-10 mL Intravenous PRN Yogi Diaz DO           Allergies:  Patient has no known allergies.    Review of Systems  Review of Systems   Constitution: Positive for weakness and malaise/fatigue. Negative for chills, diaphoresis and fever.   Eyes: Positive for visual disturbance (left eye swelling).   Cardiovascular: Positive for dyspnea on exertion, near-syncope and syncope. Negative for chest pain, leg swelling, orthopnea, palpitations and paroxysmal nocturnal dyspnea.   Respiratory: Negative for shortness of breath and  "wheezing.    Hematologic/Lymphatic: Bruises/bleeds easily.   Musculoskeletal: Positive for falls.   Gastrointestinal: Negative for bloating, abdominal pain, nausea and vomiting.   Neurological: Negative for light-headedness.       Objective     Physical Exam:  Patient Vitals for the past 24 hrs:   BP Temp Temp src Pulse Resp SpO2 Height Weight   03/12/19 0828 -- -- -- 66 18 91 % -- --   03/12/19 0819 148/59 -- -- 60 -- -- -- --   03/12/19 0818 137/81 -- -- 70 -- -- -- --   03/12/19 0817 140/66 98.2 °F (36.8 °C) Oral 63 20 92 % -- --   03/12/19 0358 122/56 98.7 °F (37.1 °C) Oral 60 16 95 % -- --   03/12/19 0108 143/69 97.5 °F (36.4 °C) Oral 71 18 93 % -- --   03/11/19 2125 -- -- -- 62 -- 91 % -- --   03/11/19 2020 148/77 -- -- -- -- -- -- --   03/11/19 2018 141/72 -- -- -- -- -- -- --   03/11/19 2016 145/67 -- -- 67 16 91 % -- --   03/11/19 1556 156/86 98.5 °F (36.9 °C) Oral 68 20 93 % 165.1 cm (65\") 61 kg (134 lb 8 oz)     Physical Exam   Constitutional: She is oriented to person, place, and time. She appears well-developed and well-nourished. No distress.   HENT:   Head: Normocephalic and atraumatic.   Right Ear: External ear normal.   Nose: Nose normal.   Mouth/Throat: Oropharynx is clear and moist. No oropharyngeal exudate.   Eyes: Conjunctivae are normal. No scleral icterus.   Neck: Normal range of motion. Neck supple.   Cardiovascular: Normal rate.   Pulmonary/Chest: Effort normal and breath sounds normal. No respiratory distress. She has no wheezes. She has no rales.   Abdominal: Soft. Normal appearance. She exhibits no distension. There is no tenderness.   Musculoskeletal: Normal range of motion. She exhibits no edema.   Neurological: She is alert and oriented to person, place, and time.   Skin: Skin is warm, dry and intact. No rash noted. She is not diaphoretic. No erythema. No pallor.   Psychiatric: She has a normal mood and affect. Her behavior is normal.   Vitals reviewed.      Results Review:   I " reviewed the patient's new clinical results.    Lab Results (last 72 hours)     Procedure Component Value Units Date/Time    Troponin [[id number]]  (Abnormal) Collected:  03/12/19 0606    Specimen:  Blood Updated:  03/12/19 0657     Troponin I 0.082 ng/mL     Protime-INR [[id number]]  (Abnormal) Collected:  03/12/19 0606    Specimen:  Blood Updated:  03/12/19 0647     Protime 19.5 Seconds      INR 1.59    Basic Metabolic Panel [[id number]]  (Abnormal) Collected:  03/12/19 0606    Specimen:  Blood Updated:  03/12/19 0645     Glucose 103 mg/dL      BUN 16 mg/dL      Creatinine 0.69 mg/dL      Sodium 140 mmol/L      Potassium 3.9 mmol/L      Chloride 102 mmol/L      CO2 29.0 mmol/L      Calcium 9.6 mg/dL      eGFR Non African Amer 82 mL/min/1.73      BUN/Creatinine Ratio 23.2     Anion Gap 9.0 mmol/L     Narrative:       GFR Normal >60  Chronic Kidney Disease <60  Kidney Failure <15    CBC (No Diff) [[id number]]  (Abnormal) Collected:  03/12/19 0606    Specimen:  Blood Updated:  03/12/19 0637     WBC 8.17 10*3/mm3      RBC 3.66 10*6/mm3      Hemoglobin 12.1 g/dL      Hematocrit 35.7 %      MCV 97.5 fL      MCH 33.1 pg      MCHC 33.9 g/dL      RDW 13.4 %      RDW-SD 47.8 fl      MPV 11.6 fL      Platelets 154 10*3/mm3     BNP [[id number]]  (Abnormal) Collected:  03/11/19 1651    Specimen:  Blood Updated:  03/11/19 1734     proBNP 7,040.0 pg/mL     Troponin [[id number]]  (Abnormal) Collected:  03/11/19 1651    Specimen:  Blood Updated:  03/11/19 1734     Troponin I 0.076 ng/mL     Comprehensive Metabolic Panel [[id number]]  (Abnormal) Collected:  03/11/19 1651    Specimen:  Blood Updated:  03/11/19 1721     Glucose 132 mg/dL      BUN 16 mg/dL      Creatinine 0.61 mg/dL      Sodium 140 mmol/L      Potassium 4.2 mmol/L      Chloride 102 mmol/L      CO2 28.0 mmol/L      Calcium 9.9 mg/dL      Total Protein 7.2 g/dL      Albumin 4.80 g/dL      ALT (SGPT) 26 U/L      AST (SGOT) 29 U/L      Alkaline Phosphatase 94 U/L      Total  Bilirubin 1.3 mg/dL      eGFR Non African Amer 94 mL/min/1.73      Globulin 2.4 gm/dL      A/G Ratio 2.0 g/dL      BUN/Creatinine Ratio 26.2     Anion Gap 10.0 mmol/L     Narrative:       GFR Normal >60  Chronic Kidney Disease <60  Kidney Failure <15    Magnesium [[id number]]  (Normal) Collected:  03/11/19 1651    Specimen:  Blood Updated:  03/11/19 1721     Magnesium 1.8 mg/dL     Phosphorus [[id number]]  (Normal) Collected:  03/11/19 1651    Specimen:  Blood Updated:  03/11/19 1721     Phosphorus 3.5 mg/dL     Protime-INR [[id number]]  (Abnormal) Collected:  03/11/19 1651    Specimen:  Blood Updated:  03/11/19 1718     Protime 19.4 Seconds      INR 1.58    CBC (No Diff) [[id number]]  (Abnormal) Collected:  03/11/19 1651    Specimen:  Blood Updated:  03/11/19 1709     WBC 10.05 10*3/mm3      RBC 4.09 10*6/mm3      Hemoglobin 13.7 g/dL      Hematocrit 40.0 %      MCV 97.8 fL      MCH 33.5 pg      MCHC 34.3 g/dL      RDW 13.5 %      RDW-SD 48.4 fl      MPV 11.6 fL      Platelets 160 10*3/mm3           Lab Results   Component Value Date    ECHOEFEST 35 03/11/2019       Interpretation Summary   TTE 04.19.18  · Left ventricular wall thickness is consistent with mild septal asymmetric hypertrophy.  · Left ventricular systolic function is moderately decreased. Estimated EF = 35%.  · Left ventricular diastolic dysfunction (grade II) consistent with pseudonormalization.  · Left atrial cavity size is moderate-to-severely dilated.  · Mild aortic, mitral, pulmonic, and tricuspid valve regurgitation is present.  · There is a small (<1cm) pericardial effusion. There is no evidence of tamponade physiology.       Assessment     1. Syncope  2. Facial Fractures  3. Elevated troponin  4.  Chronic Systolic Heart Failure  5. Hypertrophic Obstructive Cardiomyopathy  6. Atrial Fibrillation  7. Chronic Anticoagulation with Warfarin  8. Bi Ventricular Pacemaker  9. Advanced Age        Plan     - Continue heart failure medical therapy at this  time.   - Follow up with primary Cardiologist, Dr. Tobin after discharge  - No evidence of VT or NSVT on interrogation of pacemaker  - Discussed ICD and indication with finding of EF on echo this admission. Patient does not think that this is something that she would be interested in given her age. She does state, along with her daughter, that they will further discuss this with Dr. Tobin who can contact us further if needed.      Thank you for the consultation, cardiology is ok for the patient to be discharged with instructions to follow up with [facility].       SHARYN Lewis  3/12/2019  9:52 AM      Please note this cardiology consultation note is the result of a face to face consultation with the patient, in addition to reviewing medical records at length by myself, SHARYN Lewis.       Time: approximately 40 minutes

## 2019-03-12 NOTE — CONSULTS
CC: left eye pain, swelling    HPI: Lisa Latham is a 79 y.o. female who presents after a LOC event resulting in a fall with injury to her left face. She c/o pain and swelling around her left eye and difficulty seeing. She states she was told she had cataracts but denies any other prior eye history.     POH: cataracts    Eye Meds: none     Past Medical History:   Diagnosis Date   • A-fib (CMS/[facility])    • CHF (congestive heart failure) (CMS/[facility])    • Coronary artery disease    • HOCM (hypertrophic obstructive cardiomyopathy) (CMS/[facility])    • Hypertension        No Known Allergies    Family History   Problem Relation Age of Onset   • Coronary artery disease Mother    • Coronary artery disease Maternal Grandmother        ROS: negative except HPI      Exam    VA near w/o correction: 20/50 OU    Pupils: R/R. No APD    IOP: 15//18    EOM: full OU    CVF: full to CF OU    Ext: wnl OD, Periorbital edema and ecchymosis OS  L/L: wnl OD, edema and ecchymosis OS  C/S: white/quiet OU  K: clear OU  AC: deep/formed OU  I: flat/round OU  L: NSC OU    DFE OS Only  Vit: clear  ONH: pink/sharp  Mac: flat, good reflex  Vsl: wnl  Periph: flat 360, no holes/tears/detachments      CT scan:  Orbital floor and medial wall fracture, minimally displaced  Small retrobulbar hematoma along posterior medial well      A/P    1. Retrobulbar hematoma  -This appears small on the CT scan. There is no increased IOP or optic nerve compromise. Recommend monitoring at this time.    2. Orbital floor fracture  -No signs of ocular injury, open globe, or muscle entrapment  -ENT to repair if needed      3. Nuclear sclerosis      Will assess vision and IOP again in 2-3 days to ensure no progression of her hematoma  If discharged prior to this will have her follow up with me at the ophthalmology group this Fri- please contact office for appt

## 2019-03-12 NOTE — PLAN OF CARE
Problem: Fall Risk (Adult)  Goal: Identify Related Risk Factors and Signs and Symptoms  Outcome: Ongoing (interventions implemented as appropriate)    Goal: Absence of Fall  Outcome: Ongoing (interventions implemented as appropriate)      Problem: Patient Care Overview  Goal: Plan of Care Review   03/12/19 0541   Coping/Psychosocial   Plan of Care Reviewed With patient   Plan of Care Review   Progress no change   OTHER   Outcome Summary VSS, prn pain meds given for right eye pain, pt refuses ice pack, continue to monitor       Problem: Syncope (Adult)  Goal: Identify Related Risk Factors and Signs and Symptoms  Outcome: Ongoing (interventions implemented as appropriate)    Goal: Physical Safety/Health Maintenance  Outcome: Ongoing (interventions implemented as appropriate)    Goal: Optimal Emotional/Functional Rains  Outcome: Ongoing (interventions implemented as appropriate)

## 2019-03-13 NOTE — OUTREACH NOTE
Prep Survey      Responses   Facility patient discharged from?  [facility]   Is patient eligible?  Yes   Discharge diagnosis  [place] CHF with EF 35%, had syncopy and fall, Traumatic hematoma of left orbit, Closed fracture of left orbital floor, Scalp hematoma    Does the patient have one of the following disease processes/diagnoses(primary or secondary)?  CHF   Does the patient have Home health ordered?  No   Is there a DME ordered?  No   Comments regarding appointments  March 15th at 3:10 see Theron Patiño, Monday 3/25/2019 at 1:00 see Yogi Hylton,    Medication alerts for this patient  No med changes   Prep survey completed?  Yes          Jodi Wolfe RN

## 2019-03-15 NOTE — OUTREACH NOTE
CHF Week 1 Survey      Responses   Facility patient discharged from?  [facility]   Does the patient have one of the following disease processes/diagnoses(primary or secondary)?  CHF   Is there a successful TCM telephone encounter documented?  No   CHF Week 1 attempt successful?  Yes   Call start time  0936   Revoke  Decline to participate   Call end time  0936          Isis Gao RN

## 2019-04-24 NOTE — TELEPHONE ENCOUNTER
Patient notified of appointment date and time (5/23/2019 at 11:15).  Provided patient with RN's direct line if she has any questions or concerns in the interim.

## 2019-04-24 NOTE — TELEPHONE ENCOUNTER
Timothy, [employer] Device Rep, called to inform RN that patient has < 1 month remaining on BIV Pacer battery.  Patient's device is followed by Dr. Tobin but Dr. Monsivais does patient's gen changes.  Patient has history of chronically elevated LV pacing threshold that has drained her battery quickly.  Most recent gen change was on 12/2/2015.  Timothy will have records from Dr. Murcia's office faxed to [facility].  RN will schedule 1 month battery check in our device clinic.  Per Timothy, it is up to Dr. Monsivais as to whether or not he prefers to place a new LV lead, do an epicardial lead, or just do a gen change.  RN will discuss with Dr. Monsivais.

## 2019-04-30 NOTE — TELEPHONE ENCOUNTER
Patient notified that Dr. Monsivais is considering placing a new lead during the gen change.  Understanding verbalized.

## 2019-05-31 PROBLEM — Z95.0 PRESENCE OF BIVENTRICULAR CARDIAC PACEMAKER: Status: ACTIVE | Noted: 2019-01-01

## 2019-07-11 NOTE — PROGRESS NOTES
Bi-V Pacemaker Evaluation Report  IN OFFICE INTERROGATION    July 11, 2019    Primary Cardiologist: Dr. Mahad Monsivais (Followed by Dr. Tobin, Dr. Monsivais performs gen changes)  : [employer] Model: Consulta CRT-P C4TR01  Implant date: 12/2/2015    Reason for evaluation: battery check  Indication for pacemaker: congestive heart failure    Measurements  Atrial sensing - P wave: 0.3mV  Atrial threshold: N/P  Atrial lead impedance: >3000 ohms  Ventricular sensing - R wave: 4.1 mV  RV threshold:  0.75 V @ 0.4 ms    RV lead impedance: 494 ohms  LV Threshold:  N/P (4.75V @ 1ms on 5/29/2019 check)  LV lead impedance:  380 ohms      Diagnostic Data  Atrial paced: 0 %  Ventricular paced: 98 %    Episodes recorded since 5/29/2019:  NS-VT x 1, duration 4 seconds, rate 158 bpm  Hx of chronic AF; on coumadin    Battery status: elective replacement time   2.74V RRT reached on 20-Jun-2019      Final Parameters  Mode:VVIR   Lower rate: 60 bpm     RV Amplitude: 2 V   Pulse width: 0.4 ms   Sensitivity: 0.9 mV  LV Amplitude: 6 V   Pulse width: 1 ms     Changes made: No changes made.  Conclusions: normal pacemaker function, elevated ventricular pacing threshold and battery at elective replacement voltage    Follow up: 6 weeks post gen change/new LV lead placement

## 2019-07-22 NOTE — TELEPHONE ENCOUNTER
Patient called stating that she believes she heard her pacemaker beep once last night and twice today.  Patient has a [employer] BIV Pacemaker.  According to Iwona Prescott, her device does not have the capability to beep and the beep she heard must be from another device.  Patient notified that her pacemaker cannot beep and that she must be hearing the beep from another device around her home.  Plan is for gen change and possible LV lead replacement on Thursday, 7/25/2019.  Understanding verbalized.

## 2019-09-05 NOTE — PROGRESS NOTES
Bi-V Pacemaker Evaluation Report  IN OFFICE INTERROGATION    September 5, 2019    Primary Cardiologist: Loi  : [employer] Model: Percepta CRT-P W1TR01  Implant date: 7/25/2019    Reason for evaluation: generator change follow up  Indication for pacemaker: cardiomyopathy    Measurements  Atrial sensing - P wave: N/P  Atrial threshold: N/P  Atrial lead impedance: >3000 ohms  Ventricular sensing - R wave: 3.5 mV  RV threshold:  0.875 V @ 0.4 ms    RV lead impedance: 494 ohms  LV Threshold:  3.25 V @ 0.8 ms (chronic, stable)  LV lead impedance:  399 ohms      Diagnostic Data  Atrial paced: 0 %  Ventricular paced: 97.8 %    Episodes recorded since 7/25/2019:  No ventricular high rate episodes recorded.  Hx of Chronic AF; on coumadin    Incision:  Healed.  Well approximated without erythema, edema, warmth, or drainage of any kind.  Three sutures trimmed to skin level using sterile suture removal kit.  Patient tolerated well.  Instructed patient to contact our office if any signs/symptoms of infection develop.  Understanding verbalized.      Battery status: satisfactory   Estimated 4.2 years remaining      Final Parameters  Mode:VVIR   Lower rate: 60 bpm     RV Amplitude: 2 V   Pulse width: 0.4 ms   Sensitivity: 0.9 mV  LV Amplitude: 4.5 V   Pulse width: 0.8 ms     Changes made: No changes made.  Conclusions: normal pacemaker function, stable pacing and sensing thresholds, elevated ventricular pacing threshold and adequate battery reserve    Follow up: With Dr. Tobin office, per patient request.  Patient instructed to call Dr. Murcia's office to make a 6 month device check appointment.  Understanding verbalized.

## 2019-10-31 NOTE — TELEPHONE ENCOUNTER
Meri, from [facility] ED, called inquiring if patient's pacemaker is MRI compatible.  Although all components of patient's device are MRI compatible, RN viewed today's chest x-ray and noted what appears to be the tip of an old atrial lead in the port on the header that prevents patient from being MRI compatible.  No MRI compatible atrial port cap is documented on implant sheet.  Iwona Prescott, [employer] Device Rep, also viewed chest x-ray and agrees that system is not MRI compatible.  ED notified.

## 2019-11-10 NOTE — TELEPHONE ENCOUNTER
"The patient was in the ED on 10/30/19 for CVA. The nurse at [facility] called needing something for comfort for the patient.     Reason for Disposition  • [1] Caller requests to speak ONLY to PCP AND [2] URGENT question    Additional Information  • Negative: Lab calling with strep throat test results and triager can call in prescription  • Negative: Lab calling with urinalysis test results and triager can call in prescription  • Negative: Medication questions  • Negative: ED call to PCP  • Negative: Physician call to PCP  • Negative: Call about patient who is currently hospitalized  • Negative: Lab or radiology calling with CRITICAL test results  • Negative: [1] Prescription not at pharmacy AND [2] was prescribed today by PCP  • Negative: [1] Follow-up call from patient regarding patient's clinical status AND [2] information urgent    Answer Assessment - Initial Assessment Questions  1. REASON FOR CALL or QUESTION: \"What is your reason for calling today?\" or \"How can I best  help you?\" or \"What question do you have that I can help answer?\"      See note   2. CALLER: Document the source of call. (e.g., laboratory, patient).      See note    Protocols used: PCP CALL - NO TRIAGE-ADULT-      "

## 2019-11-11 LAB — BACTERIA SPEC AEROBE CULT: ABNORMAL

## 2020-08-25 LAB — HOLD SPECIMEN: NORMAL

## (undated) DEVICE — Device: Brand: MEDEX

## (undated) DEVICE — STPCK 3/WY HP M/RA W/OFF/HNDL 1050PSI STRL

## (undated) DEVICE — ELECTRD PAD DEFIB A/

## (undated) DEVICE — SOL NS 500ML

## (undated) DEVICE — SOLIDIFIER LIQUI LOC PLUS 2000CC

## (undated) DEVICE — PINNACLE INTRODUCER SHEATH: Brand: PINNACLE

## (undated) DEVICE — CANN CO2/O2 NASL A/

## (undated) DEVICE — SOL IRR NACL 0.9PCT BT 1000ML

## (undated) DEVICE — RADIFOCUS OPTITORQUE ANGIOGRAPHIC CATHETER: Brand: OPTITORQUE

## (undated) DEVICE — INF TL MULIPACK FR6: Brand: INFINITI

## (undated) DEVICE — TR BAND RADIAL ARTERY COMPRESSION DEVICE: Brand: TR BAND

## (undated) DEVICE — PK PM 30

## (undated) DEVICE — PK CATH CARD 30

## (undated) DEVICE — ST PRIM PUMP 20DRP 3VLV 127IN

## (undated) DEVICE — Device

## (undated) DEVICE — CABL BIPOL W/ALLGTR CLIP/SM 12FT

## (undated) DEVICE — GLIDESHEATH SLENDER STAINLESS STEEL KIT: Brand: GLIDESHEATH SLENDER

## (undated) DEVICE — 3M™ IOBAN™ 2 ANTIMICROBIAL INCISE DRAPE 6650EZ: Brand: IOBAN™ 2

## (undated) DEVICE — DRP FEM/RAD W/DUAL 86X135IN

## (undated) DEVICE — SKIN AFFIX SURG ADHESIVE 72/CS 0.55ML: Brand: MEDLINE

## (undated) DEVICE — SUP ARMBRD ART/LINE BLU